# Patient Record
Sex: FEMALE | Race: WHITE | NOT HISPANIC OR LATINO | URBAN - METROPOLITAN AREA
[De-identification: names, ages, dates, MRNs, and addresses within clinical notes are randomized per-mention and may not be internally consistent; named-entity substitution may affect disease eponyms.]

---

## 2017-03-26 ENCOUNTER — INPATIENT (INPATIENT)
Facility: HOSPITAL | Age: 82
LOS: 1 days | Discharge: HOME | End: 2017-03-28
Attending: INTERNAL MEDICINE | Admitting: INTERNAL MEDICINE
Payer: MEDICARE

## 2017-03-26 VITALS
HEART RATE: 79 BPM | RESPIRATION RATE: 20 BRPM | HEIGHT: 64 IN | WEIGHT: 130.07 LBS | OXYGEN SATURATION: 99 % | DIASTOLIC BLOOD PRESSURE: 87 MMHG | TEMPERATURE: 98 F | SYSTOLIC BLOOD PRESSURE: 191 MMHG

## 2017-03-26 DIAGNOSIS — Z90.89 ACQUIRED ABSENCE OF OTHER ORGANS: Chronic | ICD-10-CM

## 2017-03-26 DIAGNOSIS — Z90.721 ACQUIRED ABSENCE OF OVARIES, UNILATERAL: Chronic | ICD-10-CM

## 2017-03-26 LAB
ALBUMIN SERPL ELPH-MCNC: 3.7 G/DL — SIGNIFICANT CHANGE UP (ref 3.3–5)
ALP SERPL-CCNC: 82 U/L — SIGNIFICANT CHANGE UP (ref 40–120)
ALT FLD-CCNC: 16 U/L — SIGNIFICANT CHANGE UP (ref 12–78)
ANION GAP SERPL CALC-SCNC: 8 MMOL/L — SIGNIFICANT CHANGE UP (ref 5–17)
APPEARANCE UR: CLEAR — SIGNIFICANT CHANGE UP
APTT BLD: 32.7 SEC — SIGNIFICANT CHANGE UP (ref 27.5–37.4)
AST SERPL-CCNC: 18 U/L — SIGNIFICANT CHANGE UP (ref 15–37)
BACTERIA # UR AUTO: (no result)
BASOPHILS # BLD AUTO: 0.1 K/UL — SIGNIFICANT CHANGE UP (ref 0–0.2)
BASOPHILS NFR BLD AUTO: 1.3 % — SIGNIFICANT CHANGE UP (ref 0–2)
BILIRUB SERPL-MCNC: 0.4 MG/DL — SIGNIFICANT CHANGE UP (ref 0.2–1.2)
BILIRUB UR-MCNC: NEGATIVE — SIGNIFICANT CHANGE UP
BUN SERPL-MCNC: 7 MG/DL — SIGNIFICANT CHANGE UP (ref 7–23)
CALCIUM SERPL-MCNC: 8.6 MG/DL — SIGNIFICANT CHANGE UP (ref 8.5–10.1)
CHLORIDE SERPL-SCNC: 99 MMOL/L — SIGNIFICANT CHANGE UP (ref 96–108)
CO2 SERPL-SCNC: 28 MMOL/L — SIGNIFICANT CHANGE UP (ref 22–31)
COLOR SPEC: YELLOW — SIGNIFICANT CHANGE UP
CREAT SERPL-MCNC: 0.67 MG/DL — SIGNIFICANT CHANGE UP (ref 0.5–1.3)
DIFF PNL FLD: (no result)
EOSINOPHIL # BLD AUTO: 0 K/UL — SIGNIFICANT CHANGE UP (ref 0–0.5)
EOSINOPHIL NFR BLD AUTO: 0.1 % — SIGNIFICANT CHANGE UP (ref 0–6)
EPI CELLS # UR: SIGNIFICANT CHANGE UP
GLUCOSE SERPL-MCNC: 123 MG/DL — HIGH (ref 70–99)
GLUCOSE UR QL: NEGATIVE MG/DL — SIGNIFICANT CHANGE UP
HCT VFR BLD CALC: 42.3 % — SIGNIFICANT CHANGE UP (ref 34.5–45)
HGB BLD-MCNC: 14.3 G/DL — SIGNIFICANT CHANGE UP (ref 11.5–15.5)
INR BLD: 1.14 RATIO — SIGNIFICANT CHANGE UP (ref 0.88–1.16)
KETONES UR-MCNC: NEGATIVE — SIGNIFICANT CHANGE UP
LEUKOCYTE ESTERASE UR-ACNC: (no result)
LYMPHOCYTES # BLD AUTO: 0.8 K/UL — LOW (ref 1–3.3)
LYMPHOCYTES # BLD AUTO: 15.6 % — SIGNIFICANT CHANGE UP (ref 13–44)
MCHC RBC-ENTMCNC: 31.9 PG — SIGNIFICANT CHANGE UP (ref 27–34)
MCHC RBC-ENTMCNC: 33.9 GM/DL — SIGNIFICANT CHANGE UP (ref 32–36)
MCV RBC AUTO: 94 FL — SIGNIFICANT CHANGE UP (ref 80–100)
MONOCYTES # BLD AUTO: 0.3 K/UL — SIGNIFICANT CHANGE UP (ref 0–0.9)
MONOCYTES NFR BLD AUTO: 5.8 % — SIGNIFICANT CHANGE UP (ref 2–14)
NEUTROPHILS # BLD AUTO: 4.1 K/UL — SIGNIFICANT CHANGE UP (ref 1.8–7.4)
NEUTROPHILS NFR BLD AUTO: 77.2 % — HIGH (ref 43–77)
NITRITE UR-MCNC: NEGATIVE — SIGNIFICANT CHANGE UP
NT-PROBNP SERPL-SCNC: 285 PG/ML — SIGNIFICANT CHANGE UP (ref 0–450)
PH UR: 8 — SIGNIFICANT CHANGE UP (ref 4.8–8)
PLATELET # BLD AUTO: 227 K/UL — SIGNIFICANT CHANGE UP (ref 150–400)
POTASSIUM SERPL-MCNC: 3.7 MMOL/L — SIGNIFICANT CHANGE UP (ref 3.5–5.3)
POTASSIUM SERPL-SCNC: 3.7 MMOL/L — SIGNIFICANT CHANGE UP (ref 3.5–5.3)
PROT SERPL-MCNC: 7.4 GM/DL — SIGNIFICANT CHANGE UP (ref 6–8.3)
PROT UR-MCNC: 15 MG/DL
PROTHROM AB SERPL-ACNC: 12.3 SEC — SIGNIFICANT CHANGE UP (ref 9.8–12.7)
RBC # BLD: 4.5 M/UL — SIGNIFICANT CHANGE UP (ref 3.8–5.2)
RBC # FLD: 13 % — SIGNIFICANT CHANGE UP (ref 10.3–14.5)
RBC CASTS # UR COMP ASSIST: SIGNIFICANT CHANGE UP /HPF (ref 0–4)
SODIUM SERPL-SCNC: 135 MMOL/L — SIGNIFICANT CHANGE UP (ref 135–145)
SP GR SPEC: 1.01 — SIGNIFICANT CHANGE UP (ref 1.01–1.02)
TROPONIN I SERPL-MCNC: <0.015 NG/ML — SIGNIFICANT CHANGE UP (ref 0.01–0.04)
TROPONIN I SERPL-MCNC: <0.015 NG/ML — SIGNIFICANT CHANGE UP (ref 0.01–0.04)
TSH SERPL-MCNC: 2.27 UIU/ML — SIGNIFICANT CHANGE UP (ref 0.36–3.74)
UROBILINOGEN FLD QL: NEGATIVE MG/DL — SIGNIFICANT CHANGE UP
WBC # BLD: 5.2 K/UL — SIGNIFICANT CHANGE UP (ref 3.8–10.5)
WBC # FLD AUTO: 5.2 K/UL — SIGNIFICANT CHANGE UP (ref 3.8–10.5)
WBC UR QL: SIGNIFICANT CHANGE UP

## 2017-03-26 PROCEDURE — 71010: CPT | Mod: 26

## 2017-03-26 PROCEDURE — 99285 EMERGENCY DEPT VISIT HI MDM: CPT

## 2017-03-26 PROCEDURE — 70450 CT HEAD/BRAIN W/O DYE: CPT | Mod: 26

## 2017-03-26 PROCEDURE — 71250 CT THORAX DX C-: CPT | Mod: 26

## 2017-03-26 PROCEDURE — 93010 ELECTROCARDIOGRAM REPORT: CPT

## 2017-03-26 RX ORDER — SODIUM CHLORIDE 9 MG/ML
1000 INJECTION INTRAMUSCULAR; INTRAVENOUS; SUBCUTANEOUS
Qty: 0 | Refills: 0 | Status: DISCONTINUED | OUTPATIENT
Start: 2017-03-26 | End: 2017-03-27

## 2017-03-26 RX ORDER — ONDANSETRON 8 MG/1
4 TABLET, FILM COATED ORAL EVERY 6 HOURS
Qty: 0 | Refills: 0 | Status: DISCONTINUED | OUTPATIENT
Start: 2017-03-26 | End: 2017-03-28

## 2017-03-26 RX ORDER — SODIUM CHLORIDE 9 MG/ML
3 INJECTION INTRAMUSCULAR; INTRAVENOUS; SUBCUTANEOUS ONCE
Qty: 0 | Refills: 0 | Status: COMPLETED | OUTPATIENT
Start: 2017-03-26 | End: 2017-03-26

## 2017-03-26 RX ORDER — HEPARIN SODIUM 5000 [USP'U]/ML
5000 INJECTION INTRAVENOUS; SUBCUTANEOUS EVERY 12 HOURS
Qty: 0 | Refills: 0 | Status: DISCONTINUED | OUTPATIENT
Start: 2017-03-26 | End: 2017-03-28

## 2017-03-26 RX ORDER — SODIUM CHLORIDE 9 MG/ML
2000 INJECTION INTRAMUSCULAR; INTRAVENOUS; SUBCUTANEOUS ONCE
Qty: 0 | Refills: 0 | Status: COMPLETED | OUTPATIENT
Start: 2017-03-26 | End: 2017-03-26

## 2017-03-26 RX ORDER — ASPIRIN/CALCIUM CARB/MAGNESIUM 324 MG
325 TABLET ORAL ONCE
Qty: 0 | Refills: 0 | Status: COMPLETED | OUTPATIENT
Start: 2017-03-26 | End: 2017-03-26

## 2017-03-26 RX ADMIN — SODIUM CHLORIDE 70 MILLILITER(S): 9 INJECTION INTRAMUSCULAR; INTRAVENOUS; SUBCUTANEOUS at 19:48

## 2017-03-26 RX ADMIN — SODIUM CHLORIDE 2000 MILLILITER(S): 9 INJECTION INTRAMUSCULAR; INTRAVENOUS; SUBCUTANEOUS at 10:06

## 2017-03-26 RX ADMIN — SODIUM CHLORIDE 3 MILLILITER(S): 9 INJECTION INTRAMUSCULAR; INTRAVENOUS; SUBCUTANEOUS at 10:06

## 2017-03-26 NOTE — ED PROVIDER NOTE - MEDICAL DECISION MAKING DETAILS
Elderly female pw dizziness and weakness likely near syncope will perform labs CT, XR and admit to hospital service for further evaluation

## 2017-03-26 NOTE — ED PROVIDER NOTE - OBJECTIVE STATEMENT
89 y/o F with Hx of HLD, tonsillectomy,  ovary removal presents to ED for sudden onset of generalized weakness and dizziness. Pt states today she woke up and felt dizzy, she then felt generalized weakness which has made it difficult to walk without support. Pt denies fever, chills, HA, SOB. No travel or sick contact. No illegal drugs, former smoker.

## 2017-03-26 NOTE — H&P ADULT - NSHPLABSRESULTS_GEN_ALL_CORE
CARDIAC MARKERS ( 26 Mar 2017 13:45 )  <0.015 ng/mL / x     / x     / x     / x      CARDIAC MARKERS ( 26 Mar 2017 11:14 )  <0.015 ng/mL / x     / x     / x     / x                                14.3   5.2   )-----------( 227      ( 26 Mar 2017 11:14 )             42.3     26 Mar 2017 11:14    135    |  99     |  7      ----------------------------<  123    3.7     |  28     |  0.67     Ca    8.6        26 Mar 2017 11:14    TPro  7.4    /  Alb  3.7    /  TBili  0.4    /  DBili  x      /  AST  18     /  ALT  16     /  AlkPhos  82     26 Mar 2017 11:14    PT/INR - ( 26 Mar 2017 11:14 )   PT: 12.3 sec;   INR: 1.14 ratio         PTT - ( 26 Mar 2017 11:14 )  PTT:32.7 sec  CAPILLARY BLOOD GLUCOSE    LIVER FUNCTIONS - ( 26 Mar 2017 11:14 )  Alb: 3.7 g/dL / Pro: 7.4 gm/dL / ALK PHOS: 82 U/L / ALT: 16 U/L / AST: 18 U/L / GGT: x           Urinalysis Basic - ( 26 Mar 2017 13:15 )    Color: Yellow / Appearance: Clear / S.010 / pH: x  Gluc: x / Ketone: Negative  / Bili: Negative / Urobili: Negative mg/dL   Blood: x / Protein: 15 mg/dL / Nitrite: Negative   Leuk Esterase: Trace / RBC: 0-2 /HPF / WBC 3-5   Sq Epi: x / Non Sq Epi: Occasional / Bacteria: Moderate

## 2017-03-26 NOTE — H&P ADULT - NSHPPHYSICALEXAM_GEN_ALL_CORE
T(C): 36.5, Max: 36.5 (03-26 @ 10:03)  HR: 79 (78 - 81)  BP: 114/99 (114/99 - 191/87)  RR: 20 (20 - 22)  SpO2: 95% (95% - 99%)  Wt(kg): --    Gen: AAOx3, NAD  HEENT: NCAT, EOMI  Neck: Supple  CV: nml S1S2, RRR  Lungs: CTABL  Abd: Soft, NT, ND, BS+  Ext: No edema  Neuro: Non focal

## 2017-03-26 NOTE — H&P ADULT - HISTORY OF PRESENT ILLNESS
Patient 89yo female with PMHx of HLD, presents to the ER for weakness and dizziness. Pt reports that she has had chronic dizziness, for the last 2-3 months, characterized by dizziness/lightheadedness when standing up from sitting position. Pt denies falls, trauma, syncope, LOC. Pt denies fever, chills, chest pain, sob. No other constitutional symptoms. She states that when gets up from sitting position she feels dizzy, which makes it difficult for her to ambulate.

## 2017-03-26 NOTE — ED PROVIDER NOTE - NS ED MD SCRIBE ATTENDING SCRIBE SECTIONS
DISPOSITION/VITAL SIGNS( Pullset)/PHYSICAL EXAM/HISTORY OF PRESENT ILLNESS/PAST MEDICAL/SURGICAL/SOCIAL HISTORY/REVIEW OF SYSTEMS RESULTS/HISTORY OF PRESENT ILLNESS/PHYSICAL EXAM/PAST MEDICAL/SURGICAL/SOCIAL HISTORY/VITAL SIGNS( Pullset)/REVIEW OF SYSTEMS/DISPOSITION

## 2017-03-26 NOTE — H&P ADULT - ASSESSMENT
89yo female with near syncope, dizziness    # Dizziness/Near Syncope  - unclear etiology  - pt afebrile, HD stable, comfortable on room air, non focal neuro exam  - EKG with 1st deg AVB, NO ischemic changes  - CTH negative for acute intracranial pathology  - ECHO    # HLD  - check Lipid panel    # DVT ppx HSQ

## 2017-03-26 NOTE — H&P ADULT - NSHPREVIEWOFSYSTEMS_GEN_ALL_CORE
(-)Fever, chills, cough, chest pain, sob, headache, palpitations, abd pain, n/v/d, leg swelling  (+) dizziness

## 2017-03-27 LAB
ANION GAP SERPL CALC-SCNC: 8 MMOL/L — SIGNIFICANT CHANGE UP (ref 5–17)
BASOPHILS # BLD AUTO: 0.1 K/UL — SIGNIFICANT CHANGE UP (ref 0–0.2)
BASOPHILS NFR BLD AUTO: 1.5 % — SIGNIFICANT CHANGE UP (ref 0–2)
BUN SERPL-MCNC: 6 MG/DL — LOW (ref 7–23)
CALCIUM SERPL-MCNC: 8.1 MG/DL — LOW (ref 8.5–10.1)
CHLORIDE SERPL-SCNC: 105 MMOL/L — SIGNIFICANT CHANGE UP (ref 96–108)
CO2 SERPL-SCNC: 26 MMOL/L — SIGNIFICANT CHANGE UP (ref 22–31)
CREAT SERPL-MCNC: 0.54 MG/DL — SIGNIFICANT CHANGE UP (ref 0.5–1.3)
CULTURE RESULTS: SIGNIFICANT CHANGE UP
EOSINOPHIL # BLD AUTO: 0 K/UL — SIGNIFICANT CHANGE UP (ref 0–0.5)
EOSINOPHIL NFR BLD AUTO: 0.8 % — SIGNIFICANT CHANGE UP (ref 0–6)
GLUCOSE SERPL-MCNC: 89 MG/DL — SIGNIFICANT CHANGE UP (ref 70–99)
HCT VFR BLD CALC: 37.5 % — SIGNIFICANT CHANGE UP (ref 34.5–45)
HGB BLD-MCNC: 12.4 G/DL — SIGNIFICANT CHANGE UP (ref 11.5–15.5)
LYMPHOCYTES # BLD AUTO: 1.5 K/UL — SIGNIFICANT CHANGE UP (ref 1–3.3)
LYMPHOCYTES # BLD AUTO: 25.6 % — SIGNIFICANT CHANGE UP (ref 13–44)
MCHC RBC-ENTMCNC: 31.2 PG — SIGNIFICANT CHANGE UP (ref 27–34)
MCHC RBC-ENTMCNC: 33 GM/DL — SIGNIFICANT CHANGE UP (ref 32–36)
MCV RBC AUTO: 94.5 FL — SIGNIFICANT CHANGE UP (ref 80–100)
MONOCYTES # BLD AUTO: 0.6 K/UL — SIGNIFICANT CHANGE UP (ref 0–0.9)
MONOCYTES NFR BLD AUTO: 10.3 % — SIGNIFICANT CHANGE UP (ref 2–14)
NEUTROPHILS # BLD AUTO: 3.6 K/UL — SIGNIFICANT CHANGE UP (ref 1.8–7.4)
NEUTROPHILS NFR BLD AUTO: 61.8 % — SIGNIFICANT CHANGE UP (ref 43–77)
PLATELET # BLD AUTO: 222 K/UL — SIGNIFICANT CHANGE UP (ref 150–400)
POTASSIUM SERPL-MCNC: 3.5 MMOL/L — SIGNIFICANT CHANGE UP (ref 3.5–5.3)
POTASSIUM SERPL-SCNC: 3.5 MMOL/L — SIGNIFICANT CHANGE UP (ref 3.5–5.3)
RBC # BLD: 3.97 M/UL — SIGNIFICANT CHANGE UP (ref 3.8–5.2)
RBC # FLD: 13.4 % — SIGNIFICANT CHANGE UP (ref 10.3–14.5)
SODIUM SERPL-SCNC: 139 MMOL/L — SIGNIFICANT CHANGE UP (ref 135–145)
SPECIMEN SOURCE: SIGNIFICANT CHANGE UP
WBC # BLD: 5.8 K/UL — SIGNIFICANT CHANGE UP (ref 3.8–10.5)
WBC # FLD AUTO: 5.8 K/UL — SIGNIFICANT CHANGE UP (ref 3.8–10.5)

## 2017-03-27 PROCEDURE — 93306 TTE W/DOPPLER COMPLETE: CPT | Mod: 26

## 2017-03-27 RX ORDER — POTASSIUM CHLORIDE 20 MEQ
40 PACKET (EA) ORAL ONCE
Qty: 0 | Refills: 0 | Status: COMPLETED | OUTPATIENT
Start: 2017-03-27 | End: 2017-03-27

## 2017-03-27 RX ADMIN — Medication 40 MILLIEQUIVALENT(S): at 12:31

## 2017-03-27 RX ADMIN — Medication 1 TABLET(S): at 12:31

## 2017-03-27 NOTE — PROGRESS NOTE ADULT - SUBJECTIVE AND OBJECTIVE BOX
Chief Complaint/Reason for Admission: Dizziness	  History of Present Illness: 	  Patient 89yo female with PMHx of HLD, presents to the ER for weakness and dizziness. Pt reports that she has had chronic dizziness, for the last 2-3 months, characterized by dizziness/lightheadedness when standing up from sitting position. Pt denies falls, trauma, syncope, LOC. Pt denies fever, chills, chest pain, sob. No other constitutional symptoms. She states that when gets up from sitting position she feels dizzy, which makes it difficult for her to ambulate.     3/27: Pt states that symptoms occured for 1 day and feels better today.  Walked with PT today. No CP, sob, n/v, diaphoreisis, palpitations.     Review of Systems:	  Other Review of Systems: All other review of systems negative, except as noted in HPI	    Vital Signs Last 24 Hrs  T(C): 36.9, Max: 36.9 (03-26 @ 18:42)  T(F): 98.5, Max: 98.5 (03-27 @ 10:50)  HR: 73 (73 - 86)  BP: 149/69 (146/55 - 176/74)  BP(mean): --  RR: 17 (17 - 17)  SpO2: 97% (97% - 98%)    I&O's Summary  I & Os for 24h ending 27 Mar 2017 07:00  =============================================  IN: 605 ml / OUT: 400 ml / NET: 205 ml    I & Os for current day (as of 27 Mar 2017 16:28)  =============================================  IN: 430 ml / OUT: 2 ml / NET: 428 ml      CAPILLARY BLOOD GLUCOSE      PHYSICAL EXAM:  Constitutional: NAD, awake and alert  HEENT: EOMI, Normal Hearing, MMM  Neck: Soft and supple, No JVD  Respiratory: Breath sounds are clear bilaterally, No wheezing, rales or rhonchi  Cardiovascular: S1 and S2, regular rate and rhythm, no Murmurs, gallops or rubs  Gastrointestinal: Bowel Sounds present, soft, nontender, nondistended, no guarding, no rebound  Extremities: No peripheral edema  Vascular: 2+ peripheral pulses  Neurological: A/O x 3, no focal deficits  Musculoskeletal: 5/5 strength b/l upper and lower extremities  Skin: No rashes    MEDICATIONS:  MEDICATIONS  (STANDING):  heparin  Injectable 5000Unit(s) SubCutaneous every 12 hours  sodium chloride 0.9%. 1000milliLiter(s) IV Continuous <Continuous>  multivitamin 1Tablet(s) Oral daily      LABS: All Labs Reviewed:                        12.4   5.8   )-----------( 222      ( 27 Mar 2017 05:50 )             37.5     27 Mar 2017 05:50    139    |  105    |  6      ----------------------------<  89     3.5     |  26     |  0.54     Ca    8.1        27 Mar 2017 05:50    TPro  7.4    /  Alb  3.7    /  TBili  0.4    /  DBili  x      /  AST  18     /  ALT  16     /  AlkPhos  82     26 Mar 2017 11:14    PT/INR - ( 26 Mar 2017 11:14 )   PT: 12.3 sec;   INR: 1.14 ratio         PTT - ( 26 Mar 2017 11:14 )  PTT:32.7 sec  CARDIAC MARKERS ( 26 Mar 2017 13:45 )  <0.015 ng/mL / x     / x     / x     / x      CARDIAC MARKERS ( 26 Mar 2017 11:14 )  <0.015 ng/mL / x     / x     / x     / x

## 2017-03-27 NOTE — PROGRESS NOTE ADULT - ASSESSMENT
89yo female with near syncope, dizziness    # Dizziness/Near Syncope  - unclear etiology  - pt afebrile, HD stable, comfortable on room air, non focal neuro exam  - EKG with 1st deg AVB, NO ischemic changes  - CTH negative for acute intracranial pathology  - ECHO- unremarkable    # HLD  - check Lipid panel    # DVT ppx HSQ

## 2017-03-27 NOTE — PHYSICAL THERAPY INITIAL EVALUATION ADULT - MODALITIES TREATMENT COMMENTS
pt left seated in chair post Eval; chair alarm donned; HM in place; callbell in reach; pt instructed not to get up alone; call nursing for assist; juan well; pt denied pain post Eval; RASHAD Miranda made aware pt OOB

## 2017-03-28 ENCOUNTER — TRANSCRIPTION ENCOUNTER (OUTPATIENT)
Age: 82
End: 2017-03-28

## 2017-03-28 VITALS
OXYGEN SATURATION: 96 % | SYSTOLIC BLOOD PRESSURE: 135 MMHG | DIASTOLIC BLOOD PRESSURE: 54 MMHG | TEMPERATURE: 98 F | HEART RATE: 64 BPM | RESPIRATION RATE: 18 BRPM

## 2017-03-28 NOTE — PROGRESS NOTE ADULT - SUBJECTIVE AND OBJECTIVE BOX
Chief Complaint/Reason for Admission: Dizziness	  History of Present Illness: 	  Patient 89yo female with PMHx of HLD, presents to the ER for weakness and dizziness. Pt reports that she has had chronic dizziness, for the last 2-3 months, characterized by dizziness/lightheadedness when standing up from sitting position. Pt denies falls, trauma, syncope, LOC. Pt denies fever, chills, chest pain, sob. No other constitutional symptoms. She states that when gets up from sitting position she feels dizzy, which makes it difficult for her to ambulate.     3/27: Pt states that symptoms occured for 1 day and feels better today.  Walked with PT today. No CP, sob, n/v, diaphoreisis, palpitations.   3/28: Feeling weak and does not feel capable of going home. Feels she would have difficulty walking without falling and taking care of herself at home.    Review of Systems:	  Other Review of Systems: All other review of systems negative, except as noted in HPI	      Vital Signs Last 24 Hrs  T(C): 37, Max: 37 (03-28 @ 05:50)  T(F): 98.6, Max: 98.6 (03-28 @ 05:50)  HR: 69 (69 - 83)  BP: 159/59 (141/55 - 159/59)  BP(mean): --  RR: 18 (17 - 18)  SpO2: 97% (97% - 98%)    I&O's Summary    I & Os for current day (as of 28 Mar 2017 09:21)  =============================================  IN: 550 ml / OUT: 2 ml / NET: 548 ml      CAPILLARY BLOOD GLUCOSE      PHYSICAL EXAM:  Constitutional: NAD, awake and alert, frail  HEENT: EOMI, Normal Hearing, MMM  Neck: Soft and supple, No JVD  Respiratory: Breath sounds are clear bilaterally, No wheezing, rales or rhonchi  Cardiovascular: S1 and S2, regular rate and rhythm, no Murmurs, gallops or rubs  Gastrointestinal: Bowel Sounds present, soft, nontender, nondistended, no guarding, no rebound  Extremities: No peripheral edema  Vascular: 2+ peripheral pulses  Neurological: A/O x 3, no focal deficits  Musculoskeletal: 5/5 strength b/l upper and lower extremities  Skin: No rashes    MEDICATIONS:  MEDICATIONS  (STANDING):  heparin  Injectable 5000Unit(s) SubCutaneous every 12 hours  multivitamin 1Tablet(s) Oral daily      LABS: All Labs Reviewed:                        12.4   5.8   )-----------( 222      ( 27 Mar 2017 05:50 )             37.5     27 Mar 2017 05:50    139    |  105    |  6      ----------------------------<  89     3.5     |  26     |  0.54     Ca    8.1        27 Mar 2017 05:50    TPro  7.4    /  Alb  3.7    /  TBili  0.4    /  DBili  x      /  AST  18     /  ALT  16     /  AlkPhos  82     26 Mar 2017 11:14    PT/INR - ( 26 Mar 2017 11:14 )   PT: 12.3 sec;   INR: 1.14 ratio         PTT - ( 26 Mar 2017 11:14 )  PTT:32.7 sec  CARDIAC MARKERS ( 26 Mar 2017 13:45 )  <0.015 ng/mL / x     / x     / x     / x      CARDIAC MARKERS ( 26 Mar 2017 11:14 )  <0.015 ng/mL / x     / x     / x     / x          Blood Culture: 03-26 @ 11:09  Organism --  Gram Stain Blood -- Gram Stain --  Specimen Source .Urine Catheterized  Culture-Blood -- Chief Complaint/Reason for Admission: Dizziness	  History of Present Illness: 	  Patient 89yo female with PMHx of HLD, presents to the ER for weakness and dizziness. Pt reports that she has had chronic dizziness, for the last 2-3 months, characterized by dizziness/lightheadedness when standing up from sitting position. Pt denies falls, trauma, syncope, LOC. Pt denies fever, chills, chest pain, sob. No other constitutional symptoms. She states that when gets up from sitting position she feels dizzy, which makes it difficult for her to ambulate.     3/27: Pt states that symptoms occured for 1 day and feels better today.  Walked with PT today. No CP, sob, n/v, diaphoreisis, palpitations.   3/28: Ambulating in hallway, wants to go home. has no complaints presently. No lightheadedness, no cp, palpitations, sob, n/v, diaphoresis.    Review of Systems:	  Other Review of Systems: All other review of systems negative, except as noted in HPI	      Vital Signs Last 24 Hrs  T(C): 37, Max: 37 (03-28 @ 05:50)  T(F): 98.6, Max: 98.6 (03-28 @ 05:50)  HR: 69 (69 - 83)  BP: 159/59 (141/55 - 159/59)  BP(mean): --  RR: 18 (17 - 18)  SpO2: 97% (97% - 98%)    I&O's Summary    I & Os for current day (as of 28 Mar 2017 09:21)  =============================================  IN: 550 ml / OUT: 2 ml / NET: 548 ml      CAPILLARY BLOOD GLUCOSE      PHYSICAL EXAM:  Constitutional: NAD, awake and alert, frail  HEENT: EOMI, Normal Hearing, MMM  Neck: Soft and supple, No JVD  Respiratory: Breath sounds are clear bilaterally, No wheezing, rales or rhonchi  Cardiovascular: S1 and S2, regular rate and rhythm, no Murmurs, gallops or rubs  Gastrointestinal: Bowel Sounds present, soft, nontender, nondistended, no guarding, no rebound  Extremities: No peripheral edema  Vascular: 2+ peripheral pulses  Neurological: A/O x 3, no focal deficits  Musculoskeletal: 5/5 strength b/l upper and lower extremities  Skin: No rashes    MEDICATIONS:  MEDICATIONS  (STANDING):  heparin  Injectable 5000Unit(s) SubCutaneous every 12 hours  multivitamin 1Tablet(s) Oral daily      LABS: All Labs Reviewed:                        12.4   5.8   )-----------( 222      ( 27 Mar 2017 05:50 )             37.5     27 Mar 2017 05:50    139    |  105    |  6      ----------------------------<  89     3.5     |  26     |  0.54     Ca    8.1        27 Mar 2017 05:50    TPro  7.4    /  Alb  3.7    /  TBili  0.4    /  DBili  x      /  AST  18     /  ALT  16     /  AlkPhos  82     26 Mar 2017 11:14    PT/INR - ( 26 Mar 2017 11:14 )   PT: 12.3 sec;   INR: 1.14 ratio         PTT - ( 26 Mar 2017 11:14 )  PTT:32.7 sec  CARDIAC MARKERS ( 26 Mar 2017 13:45 )  <0.015 ng/mL / x     / x     / x     / x      CARDIAC MARKERS ( 26 Mar 2017 11:14 )  <0.015 ng/mL / x     / x     / x     / x          Blood Culture: 03-26 @ 11:09  Organism --  Gram Stain Blood -- Gram Stain --  Specimen Source .Urine Catheterized  Culture-Blood --

## 2017-03-28 NOTE — DISCHARGE NOTE ADULT - HOSPITAL COURSE
Patient 89yo female with PMHx of HLD, presents to the ER for weakness and dizziness. Pt reports that she has had chronic dizziness, for the last 2-3 months, characterized by dizziness/lightheadedness when standing up from sitting position. Pt denies falls, trauma, syncope, LOC. Pt denies fever, chills, chest pain, sob. No other constitutional symptoms. She states that when gets up from sitting position she feels dizzy, which makes it difficult for her to ambulate.     3/27: Pt states that symptoms occured for 1 day and feels better today.  Walked with PT today. No CP, sob, n/v, diaphoreisis, palpitations.   3/28: Ambulating in hallway, wants to go home. has no complaints presently. No lightheadedness, no cp, palpitations, sob, n/v, diaphoresis.  Ambulating well with PT.  Pt agreeable to returning home.

## 2017-03-28 NOTE — DISCHARGE NOTE ADULT - CARE PLAN
Principal Discharge DX:	Dizziness  Goal:	return to baseline  Instructions for follow-up, activity and diet:	return home. continue activities per routine.

## 2017-03-28 NOTE — PROGRESS NOTE ADULT - ASSESSMENT
89yo female with near syncope, dizziness    # Dizziness/Near Syncope  - unclear etiology  - pt afebrile, HD stable, comfortable on room air, non focal neuro exam  - EKG with 1st deg AVB, NO ischemic changes  - CTH negative for acute intracranial pathology  - ECHO- unremarkable  - no events on tele- transfer to med-surg    # HLD  - check Lipid panel    # Malnutrition  - Nutritional assessment    # DVT ppx HSQ    Dispo: PT.  AYLIN upon discharge. 89yo female with near syncope, dizziness    # Dizziness/Near Syncope  - unclear etiology  - pt afebrile, HD stable, comfortable on room air, non focal neuro exam  - EKG with 1st deg AVB, NO ischemic changes  - CTH negative for acute intracranial pathology  - ECHO- unremarkable  - no events on tele- transfer to med-surg    # HLD  - check Lipid panel    # DVT ppx HSQ    Dispo: dc home

## 2017-03-28 NOTE — DISCHARGE NOTE ADULT - PATIENT PORTAL LINK FT
“You can access the [employer] Patient Portal, offered by [facility], by registering with the following website: [email or web address]”

## 2017-03-30 DIAGNOSIS — E78.5 HYPERLIPIDEMIA, UNSPECIFIED: ICD-10-CM

## 2017-03-30 DIAGNOSIS — Z88.5 ALLERGY STATUS TO NARCOTIC AGENT: ICD-10-CM

## 2017-03-30 DIAGNOSIS — R42 DIZZINESS AND GIDDINESS: ICD-10-CM

## 2017-03-30 DIAGNOSIS — Z87.891 PERSONAL HISTORY OF NICOTINE DEPENDENCE: ICD-10-CM

## 2017-03-30 DIAGNOSIS — Z88.8 ALLERGY STATUS TO OTHER DRUGS, MEDICAMENTS AND BIOLOGICAL SUBSTANCES STATUS: ICD-10-CM

## 2017-03-30 DIAGNOSIS — Z90.722 ACQUIRED ABSENCE OF OVARIES, BILATERAL: ICD-10-CM

## 2017-03-30 DIAGNOSIS — R55 SYNCOPE AND COLLAPSE: ICD-10-CM

## 2017-09-12 ENCOUNTER — EMERGENCY (EMERGENCY)
Facility: HOSPITAL | Age: 82
LOS: 1 days | Discharge: HOME | End: 2017-09-12
Attending: STUDENT IN AN ORGANIZED HEALTH CARE EDUCATION/TRAINING PROGRAM
Payer: MEDICARE

## 2017-09-12 VITALS
SYSTOLIC BLOOD PRESSURE: 144 MMHG | HEART RATE: 79 BPM | DIASTOLIC BLOOD PRESSURE: 88 MMHG | RESPIRATION RATE: 16 BRPM | OXYGEN SATURATION: 96 %

## 2017-09-12 VITALS
DIASTOLIC BLOOD PRESSURE: 84 MMHG | HEART RATE: 83 BPM | WEIGHT: 139.99 LBS | RESPIRATION RATE: 18 BRPM | SYSTOLIC BLOOD PRESSURE: 158 MMHG | OXYGEN SATURATION: 98 % | TEMPERATURE: 98 F

## 2017-09-12 DIAGNOSIS — Z90.721 ACQUIRED ABSENCE OF OVARIES, UNILATERAL: Chronic | ICD-10-CM

## 2017-09-12 DIAGNOSIS — Z90.89 ACQUIRED ABSENCE OF OTHER ORGANS: Chronic | ICD-10-CM

## 2017-09-12 LAB
ALBUMIN SERPL ELPH-MCNC: 4.3 G/DL — SIGNIFICANT CHANGE UP (ref 3.3–5.2)
ALP SERPL-CCNC: 85 U/L — SIGNIFICANT CHANGE UP (ref 40–120)
ALT FLD-CCNC: 10 U/L — SIGNIFICANT CHANGE UP
ANION GAP SERPL CALC-SCNC: 12 MMOL/L — SIGNIFICANT CHANGE UP (ref 5–17)
AST SERPL-CCNC: 18 U/L — SIGNIFICANT CHANGE UP
BASOPHILS # BLD AUTO: 0 K/UL — SIGNIFICANT CHANGE UP (ref 0–0.2)
BASOPHILS NFR BLD AUTO: 0.4 % — SIGNIFICANT CHANGE UP (ref 0–2)
BILIRUB SERPL-MCNC: 0.5 MG/DL — SIGNIFICANT CHANGE UP (ref 0.4–2)
BUN SERPL-MCNC: 11 MG/DL — SIGNIFICANT CHANGE UP (ref 8–20)
CALCIUM SERPL-MCNC: 8.8 MG/DL — SIGNIFICANT CHANGE UP (ref 8.6–10.2)
CHLORIDE SERPL-SCNC: 93 MMOL/L — LOW (ref 98–107)
CK SERPL-CCNC: 45 U/L — SIGNIFICANT CHANGE UP (ref 25–170)
CO2 SERPL-SCNC: 27 MMOL/L — SIGNIFICANT CHANGE UP (ref 22–29)
CREAT SERPL-MCNC: 0.59 MG/DL — SIGNIFICANT CHANGE UP (ref 0.5–1.3)
EOSINOPHIL # BLD AUTO: 0 K/UL — SIGNIFICANT CHANGE UP (ref 0–0.5)
EOSINOPHIL NFR BLD AUTO: 0.4 % — SIGNIFICANT CHANGE UP (ref 0–6)
GLUCOSE SERPL-MCNC: 108 MG/DL — SIGNIFICANT CHANGE UP (ref 70–115)
HCT VFR BLD CALC: 40.8 % — SIGNIFICANT CHANGE UP (ref 37–47)
HGB BLD-MCNC: 13.8 G/DL — SIGNIFICANT CHANGE UP (ref 12–16)
INR BLD: 1.14 RATIO — SIGNIFICANT CHANGE UP (ref 0.88–1.16)
LYMPHOCYTES # BLD AUTO: 1.7 K/UL — SIGNIFICANT CHANGE UP (ref 1–4.8)
LYMPHOCYTES # BLD AUTO: 25.4 % — SIGNIFICANT CHANGE UP (ref 20–55)
MCHC RBC-ENTMCNC: 32.3 PG — HIGH (ref 27–31)
MCHC RBC-ENTMCNC: 33.8 G/DL — SIGNIFICANT CHANGE UP (ref 32–36)
MCV RBC AUTO: 95.6 FL — SIGNIFICANT CHANGE UP (ref 81–99)
MONOCYTES # BLD AUTO: 0.6 K/UL — SIGNIFICANT CHANGE UP (ref 0–0.8)
MONOCYTES NFR BLD AUTO: 9.6 % — SIGNIFICANT CHANGE UP (ref 3–10)
NEUTROPHILS # BLD AUTO: 4.3 K/UL — SIGNIFICANT CHANGE UP (ref 1.8–8)
NEUTROPHILS NFR BLD AUTO: 64.1 % — SIGNIFICANT CHANGE UP (ref 37–73)
PLATELET # BLD AUTO: 237 K/UL — SIGNIFICANT CHANGE UP (ref 150–400)
POTASSIUM SERPL-MCNC: 4.6 MMOL/L — SIGNIFICANT CHANGE UP (ref 3.5–5.3)
POTASSIUM SERPL-SCNC: 4.6 MMOL/L — SIGNIFICANT CHANGE UP (ref 3.5–5.3)
PROT SERPL-MCNC: 7.2 G/DL — SIGNIFICANT CHANGE UP (ref 6.6–8.7)
PROTHROM AB SERPL-ACNC: 12.6 SEC — SIGNIFICANT CHANGE UP (ref 9.8–12.7)
RBC # BLD: 4.27 M/UL — LOW (ref 4.4–5.2)
RBC # FLD: 12.9 % — SIGNIFICANT CHANGE UP (ref 11–15.6)
SODIUM SERPL-SCNC: 132 MMOL/L — LOW (ref 135–145)
TROPONIN T SERPL-MCNC: <0.01 NG/ML — SIGNIFICANT CHANGE UP (ref 0–0.06)
WBC # BLD: 6.8 K/UL — SIGNIFICANT CHANGE UP (ref 4.8–10.8)
WBC # FLD AUTO: 6.8 K/UL — SIGNIFICANT CHANGE UP (ref 4.8–10.8)

## 2017-09-12 PROCEDURE — 71046 X-RAY EXAM CHEST 2 VIEWS: CPT

## 2017-09-12 PROCEDURE — 85027 COMPLETE CBC AUTOMATED: CPT

## 2017-09-12 PROCEDURE — 71100 X-RAY EXAM RIBS UNI 2 VIEWS: CPT | Mod: 26,RT

## 2017-09-12 PROCEDURE — 82550 ASSAY OF CK (CPK): CPT

## 2017-09-12 PROCEDURE — 70450 CT HEAD/BRAIN W/O DYE: CPT

## 2017-09-12 PROCEDURE — 71020: CPT | Mod: 26

## 2017-09-12 PROCEDURE — 99284 EMERGENCY DEPT VISIT MOD MDM: CPT | Mod: 25

## 2017-09-12 PROCEDURE — 36415 COLL VENOUS BLD VENIPUNCTURE: CPT

## 2017-09-12 PROCEDURE — 80053 COMPREHEN METABOLIC PANEL: CPT

## 2017-09-12 PROCEDURE — 85610 PROTHROMBIN TIME: CPT

## 2017-09-12 PROCEDURE — 99285 EMERGENCY DEPT VISIT HI MDM: CPT

## 2017-09-12 PROCEDURE — 71101 X-RAY EXAM UNILAT RIBS/CHEST: CPT

## 2017-09-12 PROCEDURE — 84484 ASSAY OF TROPONIN QUANT: CPT

## 2017-09-12 PROCEDURE — 70450 CT HEAD/BRAIN W/O DYE: CPT | Mod: 26

## 2017-09-12 RX ORDER — SODIUM CHLORIDE 9 MG/ML
3 INJECTION INTRAMUSCULAR; INTRAVENOUS; SUBCUTANEOUS ONCE
Qty: 0 | Refills: 0 | Status: COMPLETED | OUTPATIENT
Start: 2017-09-12 | End: 2017-09-12

## 2017-09-12 RX ADMIN — SODIUM CHLORIDE 3 MILLILITER(S): 9 INJECTION INTRAMUSCULAR; INTRAVENOUS; SUBCUTANEOUS at 21:50

## 2017-09-12 NOTE — ED PROVIDER NOTE - OBJECTIVE STATEMENT
89 y/o F PSHx appendectomy and oophorectomy and PMHx hiatal hernia and acid reflux presents to ED c/o rib pain s/p fall 3 days ago. Pt reports she fell down her cement steps. [place] [facility] [place] done and was sent over to ED due to broken ribs.On 75mg Zantac BID. Non-smoker. Denies LOC, head trauma, N/V/D, fever, chills, SOB, CP, difficulty breathing, HA, numbness, tingling and abd pain. 89 y/o F PSHx appendectomy and oophorectomy and PMHx hiatal hernia and acid reflux presents to ED c/o rib pain s/p fall 3 days ago. Pt reports she fell down her cement steps. Went to [facility] due to pain, had x-rays done and was sent over to ED due to broken ribs. On 75mg Zantac BID. Non-smoker. Denies LOC, head trauma, N/V/D, fever, chills, SOB, CP, difficulty breathing, HA, numbness, tingling and abd pain.

## 2017-09-12 NOTE — ED PROVIDER NOTE - MEDICAL DECISION MAKING DETAILS
Will evaluate for extent of chest wall injury and treat accordingly. Patient with acute chest wall pain likely secondary to acute fractures. Patient declines waiting to Ct chest. Patient voiced her understanding of my concerns and states that she will not stay. Pt understands and recalls risks of leaving against medical advice, including death from worsening right pulmonary disease. Pt states that pt will see PMD. Pt advised to return to the ED if pt feels worse.

## 2017-09-12 NOTE — ED ADULT NURSE NOTE - OBJECTIVE STATEMENT
Pt A&Ox4 c/o right rib pain at this time 9/10, pt states fell on Sun on cement steps [facility] and [facility]. Pt does not take blood thinners. has no bruising or obv deformities. Pt resting comfortably, VSS, no signs of distress at this time, #20 to RAC blood sent to lab, Safety maintained.

## 2017-09-14 ENCOUNTER — TRANSCRIPTION ENCOUNTER (OUTPATIENT)
Age: 82
End: 2017-09-14

## 2017-09-28 NOTE — ED PROVIDER NOTE - CROS ED RESP ALL NEG
PREOPERATIVE DIAGNOSIS: Left knee osteoarthritis. POSTOPERATIVE DIAGNOSIS: Left knee osteoarthritis. PROCEDURE PERFORMED: Cemented left total knee arthroplasty. SURGEON:  Surinder Landa M.D. FIRST ASSISTANT:  Katerina Au PA-C.    ANESTHESIA: Spinal the distal femoral condyles in the trial template. Trial components were removed. Bony surfaces were irrigated and dried. Final components were precoated with cement which had been mixed under vacuum conditions. The bony surfaces were precoated as well.  Co - - -

## 2017-12-06 ENCOUNTER — INPATIENT (INPATIENT)
Facility: HOSPITAL | Age: 82
LOS: 5 days | Discharge: HOME HEALTH | End: 2017-12-12
Attending: EMERGENCY MEDICINE | Admitting: INTERNAL MEDICINE
Payer: MEDICARE

## 2017-12-06 VITALS
OXYGEN SATURATION: 96 % | HEART RATE: 96 BPM | WEIGHT: 119.93 LBS | HEIGHT: 64 IN | TEMPERATURE: 98 F | RESPIRATION RATE: 24 BRPM | SYSTOLIC BLOOD PRESSURE: 177 MMHG | DIASTOLIC BLOOD PRESSURE: 101 MMHG

## 2017-12-06 DIAGNOSIS — Z90.721 ACQUIRED ABSENCE OF OVARIES, UNILATERAL: Chronic | ICD-10-CM

## 2017-12-06 DIAGNOSIS — Z90.89 ACQUIRED ABSENCE OF OTHER ORGANS: Chronic | ICD-10-CM

## 2017-12-06 PROCEDURE — 71010: CPT | Mod: 26

## 2017-12-06 PROCEDURE — 93010 ELECTROCARDIOGRAM REPORT: CPT

## 2017-12-06 RX ORDER — VANCOMYCIN HCL 1 G
1000 VIAL (EA) INTRAVENOUS ONCE
Qty: 0 | Refills: 0 | Status: COMPLETED | OUTPATIENT
Start: 2017-12-06 | End: 2017-12-07

## 2017-12-06 RX ORDER — SODIUM CHLORIDE 9 MG/ML
1500 INJECTION INTRAMUSCULAR; INTRAVENOUS; SUBCUTANEOUS ONCE
Qty: 0 | Refills: 0 | Status: COMPLETED | OUTPATIENT
Start: 2017-12-06 | End: 2017-12-06

## 2017-12-06 RX ORDER — CEFEPIME 1 G/1
2000 INJECTION, POWDER, FOR SOLUTION INTRAMUSCULAR; INTRAVENOUS ONCE
Qty: 0 | Refills: 0 | Status: COMPLETED | OUTPATIENT
Start: 2017-12-06 | End: 2017-12-06

## 2017-12-06 NOTE — ED ADULT TRIAGE NOTE - CHIEF COMPLAINT QUOTE
SOB, diagnosed with PNA yesterday per EMS. Has taken two doses of levaquin so far. Worsening SOB since yesterday. + chest pain and productive cough. Denies fever.

## 2017-12-07 DIAGNOSIS — Z90.49 ACQUIRED ABSENCE OF OTHER SPECIFIED PARTS OF DIGESTIVE TRACT: Chronic | ICD-10-CM

## 2017-12-07 LAB
ALBUMIN SERPL ELPH-MCNC: 3.5 G/DL — SIGNIFICANT CHANGE UP (ref 3.3–5)
ALP SERPL-CCNC: 76 U/L — SIGNIFICANT CHANGE UP (ref 40–120)
ALT FLD-CCNC: 19 U/L — SIGNIFICANT CHANGE UP (ref 12–78)
ANION GAP SERPL CALC-SCNC: 7 MMOL/L — SIGNIFICANT CHANGE UP (ref 5–17)
ANION GAP SERPL CALC-SCNC: 8 MMOL/L — SIGNIFICANT CHANGE UP (ref 5–17)
APTT BLD: 29.1 SEC — SIGNIFICANT CHANGE UP (ref 27.5–37.4)
AST SERPL-CCNC: 22 U/L — SIGNIFICANT CHANGE UP (ref 15–37)
BASOPHILS # BLD AUTO: 0 K/UL — SIGNIFICANT CHANGE UP (ref 0–0.2)
BASOPHILS # BLD AUTO: 0.1 K/UL — SIGNIFICANT CHANGE UP (ref 0–0.2)
BASOPHILS NFR BLD AUTO: 0.6 % — SIGNIFICANT CHANGE UP (ref 0–2)
BASOPHILS NFR BLD AUTO: 0.8 % — SIGNIFICANT CHANGE UP (ref 0–2)
BILIRUB SERPL-MCNC: 0.4 MG/DL — SIGNIFICANT CHANGE UP (ref 0.2–1.2)
BUN SERPL-MCNC: 14 MG/DL — SIGNIFICANT CHANGE UP (ref 7–23)
BUN SERPL-MCNC: 17 MG/DL — SIGNIFICANT CHANGE UP (ref 7–23)
CALCIUM SERPL-MCNC: 7.7 MG/DL — LOW (ref 8.5–10.1)
CALCIUM SERPL-MCNC: 8.6 MG/DL — SIGNIFICANT CHANGE UP (ref 8.5–10.1)
CHLORIDE SERPL-SCNC: 103 MMOL/L — SIGNIFICANT CHANGE UP (ref 96–108)
CHLORIDE SERPL-SCNC: 98 MMOL/L — SIGNIFICANT CHANGE UP (ref 96–108)
CO2 SERPL-SCNC: 26 MMOL/L — SIGNIFICANT CHANGE UP (ref 22–31)
CO2 SERPL-SCNC: 28 MMOL/L — SIGNIFICANT CHANGE UP (ref 22–31)
CREAT SERPL-MCNC: 0.56 MG/DL — SIGNIFICANT CHANGE UP (ref 0.5–1.3)
CREAT SERPL-MCNC: 0.83 MG/DL — SIGNIFICANT CHANGE UP (ref 0.5–1.3)
EOSINOPHIL # BLD AUTO: 0 K/UL — SIGNIFICANT CHANGE UP (ref 0–0.5)
EOSINOPHIL # BLD AUTO: 0 K/UL — SIGNIFICANT CHANGE UP (ref 0–0.5)
EOSINOPHIL NFR BLD AUTO: 0.1 % — SIGNIFICANT CHANGE UP (ref 0–6)
EOSINOPHIL NFR BLD AUTO: 0.1 % — SIGNIFICANT CHANGE UP (ref 0–6)
GLUCOSE SERPL-MCNC: 111 MG/DL — HIGH (ref 70–99)
GLUCOSE SERPL-MCNC: 117 MG/DL — HIGH (ref 70–99)
HCT VFR BLD CALC: 36.6 % — SIGNIFICANT CHANGE UP (ref 34.5–45)
HCT VFR BLD CALC: 44.6 % — SIGNIFICANT CHANGE UP (ref 34.5–45)
HGB BLD-MCNC: 12.7 G/DL — SIGNIFICANT CHANGE UP (ref 11.5–15.5)
HGB BLD-MCNC: 15.3 G/DL — SIGNIFICANT CHANGE UP (ref 11.5–15.5)
INR BLD: 1.28 RATIO — HIGH (ref 0.88–1.16)
LACTATE SERPL-SCNC: 1.6 MMOL/L — SIGNIFICANT CHANGE UP (ref 0.7–2)
LACTATE SERPL-SCNC: 2.4 MMOL/L — HIGH (ref 0.7–2)
LYMPHOCYTES # BLD AUTO: 1.2 K/UL — SIGNIFICANT CHANGE UP (ref 1–3.3)
LYMPHOCYTES # BLD AUTO: 1.3 K/UL — SIGNIFICANT CHANGE UP (ref 1–3.3)
LYMPHOCYTES # BLD AUTO: 14.5 % — SIGNIFICANT CHANGE UP (ref 13–44)
LYMPHOCYTES # BLD AUTO: 20.6 % — SIGNIFICANT CHANGE UP (ref 13–44)
MAGNESIUM SERPL-MCNC: 2 MG/DL — SIGNIFICANT CHANGE UP (ref 1.6–2.6)
MCHC RBC-ENTMCNC: 33.1 PG — SIGNIFICANT CHANGE UP (ref 27–34)
MCHC RBC-ENTMCNC: 33.2 PG — SIGNIFICANT CHANGE UP (ref 27–34)
MCHC RBC-ENTMCNC: 34.3 GM/DL — SIGNIFICANT CHANGE UP (ref 32–36)
MCHC RBC-ENTMCNC: 34.7 GM/DL — SIGNIFICANT CHANGE UP (ref 32–36)
MCV RBC AUTO: 95.7 FL — SIGNIFICANT CHANGE UP (ref 80–100)
MCV RBC AUTO: 96.5 FL — SIGNIFICANT CHANGE UP (ref 80–100)
MONOCYTES # BLD AUTO: 0.7 K/UL — SIGNIFICANT CHANGE UP (ref 0–0.9)
MONOCYTES # BLD AUTO: 0.9 K/UL — SIGNIFICANT CHANGE UP (ref 0–0.9)
MONOCYTES NFR BLD AUTO: 10.8 % — SIGNIFICANT CHANGE UP (ref 2–14)
MONOCYTES NFR BLD AUTO: 11.1 % — SIGNIFICANT CHANGE UP (ref 2–14)
NEUTROPHILS # BLD AUTO: 4.2 K/UL — SIGNIFICANT CHANGE UP (ref 1.8–7.4)
NEUTROPHILS # BLD AUTO: 6.2 K/UL — SIGNIFICANT CHANGE UP (ref 1.8–7.4)
NEUTROPHILS NFR BLD AUTO: 67.4 % — SIGNIFICANT CHANGE UP (ref 43–77)
NEUTROPHILS NFR BLD AUTO: 74 % — SIGNIFICANT CHANGE UP (ref 43–77)
NT-PROBNP SERPL-SCNC: 482 PG/ML — HIGH (ref 0–450)
PLATELET # BLD AUTO: 189 K/UL — SIGNIFICANT CHANGE UP (ref 150–400)
PLATELET # BLD AUTO: 201 K/UL — SIGNIFICANT CHANGE UP (ref 150–400)
POTASSIUM SERPL-MCNC: 3.2 MMOL/L — LOW (ref 3.5–5.3)
POTASSIUM SERPL-MCNC: 3.4 MMOL/L — LOW (ref 3.5–5.3)
POTASSIUM SERPL-SCNC: 3.2 MMOL/L — LOW (ref 3.5–5.3)
POTASSIUM SERPL-SCNC: 3.4 MMOL/L — LOW (ref 3.5–5.3)
PROT SERPL-MCNC: 7.4 GM/DL — SIGNIFICANT CHANGE UP (ref 6–8.3)
PROTHROM AB SERPL-ACNC: 13.9 SEC — HIGH (ref 9.8–12.7)
RAPID RVP RESULT: DETECTED
RBC # BLD: 3.82 M/UL — SIGNIFICANT CHANGE UP (ref 3.8–5.2)
RBC # BLD: 4.62 M/UL — SIGNIFICANT CHANGE UP (ref 3.8–5.2)
RBC # FLD: 11.8 % — SIGNIFICANT CHANGE UP (ref 10.3–14.5)
RBC # FLD: 11.9 % — SIGNIFICANT CHANGE UP (ref 10.3–14.5)
RSV RNA SPEC QL NAA+PROBE: DETECTED
SODIUM SERPL-SCNC: 134 MMOL/L — LOW (ref 135–145)
SODIUM SERPL-SCNC: 136 MMOL/L — SIGNIFICANT CHANGE UP (ref 135–145)
WBC # BLD: 6.2 K/UL — SIGNIFICANT CHANGE UP (ref 3.8–10.5)
WBC # BLD: 8.3 K/UL — SIGNIFICANT CHANGE UP (ref 3.8–10.5)
WBC # FLD AUTO: 6.2 K/UL — SIGNIFICANT CHANGE UP (ref 3.8–10.5)
WBC # FLD AUTO: 8.3 K/UL — SIGNIFICANT CHANGE UP (ref 3.8–10.5)

## 2017-12-07 PROCEDURE — 71010: CPT | Mod: 26

## 2017-12-07 PROCEDURE — 99285 EMERGENCY DEPT VISIT HI MDM: CPT

## 2017-12-07 RX ORDER — ACETAMINOPHEN 500 MG
650 TABLET ORAL EVERY 6 HOURS
Qty: 0 | Refills: 0 | Status: DISCONTINUED | OUTPATIENT
Start: 2017-12-07 | End: 2017-12-12

## 2017-12-07 RX ORDER — IPRATROPIUM/ALBUTEROL SULFATE 18-103MCG
3 AEROSOL WITH ADAPTER (GRAM) INHALATION ONCE
Qty: 0 | Refills: 0 | Status: COMPLETED | OUTPATIENT
Start: 2017-12-07 | End: 2017-12-07

## 2017-12-07 RX ORDER — DEXTROSE MONOHYDRATE, SODIUM CHLORIDE, AND POTASSIUM CHLORIDE 50; .745; 4.5 G/1000ML; G/1000ML; G/1000ML
1000 INJECTION, SOLUTION INTRAVENOUS
Qty: 0 | Refills: 0 | Status: DISCONTINUED | OUTPATIENT
Start: 2017-12-07 | End: 2017-12-08

## 2017-12-07 RX ORDER — AZITHROMYCIN 500 MG/1
500 TABLET, FILM COATED ORAL EVERY 24 HOURS
Qty: 0 | Refills: 0 | Status: DISCONTINUED | OUTPATIENT
Start: 2017-12-07 | End: 2017-12-07

## 2017-12-07 RX ORDER — CEFTRIAXONE 500 MG/1
1 INJECTION, POWDER, FOR SOLUTION INTRAMUSCULAR; INTRAVENOUS EVERY 24 HOURS
Qty: 0 | Refills: 0 | Status: DISCONTINUED | OUTPATIENT
Start: 2017-12-07 | End: 2017-12-07

## 2017-12-07 RX ORDER — ALBUTEROL 90 UG/1
1 AEROSOL, METERED ORAL EVERY 4 HOURS
Qty: 0 | Refills: 0 | Status: DISCONTINUED | OUTPATIENT
Start: 2017-12-07 | End: 2017-12-12

## 2017-12-07 RX ORDER — ONDANSETRON 8 MG/1
4 TABLET, FILM COATED ORAL EVERY 6 HOURS
Qty: 0 | Refills: 0 | Status: DISCONTINUED | OUTPATIENT
Start: 2017-12-07 | End: 2017-12-12

## 2017-12-07 RX ORDER — SODIUM CHLORIDE 9 MG/ML
1000 INJECTION INTRAMUSCULAR; INTRAVENOUS; SUBCUTANEOUS
Qty: 0 | Refills: 0 | Status: DISCONTINUED | OUTPATIENT
Start: 2017-12-07 | End: 2017-12-07

## 2017-12-07 RX ORDER — ALBUTEROL 90 UG/1
2.5 AEROSOL, METERED ORAL EVERY 6 HOURS
Qty: 0 | Refills: 0 | Status: DISCONTINUED | OUTPATIENT
Start: 2017-12-07 | End: 2017-12-12

## 2017-12-07 RX ORDER — FAMOTIDINE 10 MG/ML
20 INJECTION INTRAVENOUS DAILY
Qty: 0 | Refills: 0 | Status: DISCONTINUED | OUTPATIENT
Start: 2017-12-07 | End: 2017-12-12

## 2017-12-07 RX ORDER — AMLODIPINE BESYLATE 2.5 MG/1
2.5 TABLET ORAL DAILY
Qty: 0 | Refills: 0 | Status: DISCONTINUED | OUTPATIENT
Start: 2017-12-07 | End: 2017-12-09

## 2017-12-07 RX ORDER — CEFTRIAXONE 500 MG/1
1000 INJECTION, POWDER, FOR SOLUTION INTRAMUSCULAR; INTRAVENOUS EVERY 24 HOURS
Qty: 0 | Refills: 0 | Status: DISCONTINUED | OUTPATIENT
Start: 2017-12-07 | End: 2017-12-07

## 2017-12-07 RX ORDER — POTASSIUM CHLORIDE 20 MEQ
40 PACKET (EA) ORAL ONCE
Qty: 0 | Refills: 0 | Status: COMPLETED | OUTPATIENT
Start: 2017-12-07 | End: 2017-12-07

## 2017-12-07 RX ORDER — HYDRALAZINE HCL 50 MG
10 TABLET ORAL ONCE
Qty: 0 | Refills: 0 | Status: COMPLETED | OUTPATIENT
Start: 2017-12-07 | End: 2017-12-07

## 2017-12-07 RX ADMIN — AMLODIPINE BESYLATE 2.5 MILLIGRAM(S): 2.5 TABLET ORAL at 04:03

## 2017-12-07 RX ADMIN — Medication 10 MILLIGRAM(S): at 12:38

## 2017-12-07 RX ADMIN — Medication 250 MILLIGRAM(S): at 01:58

## 2017-12-07 RX ADMIN — SODIUM CHLORIDE 75 MILLILITER(S): 9 INJECTION INTRAMUSCULAR; INTRAVENOUS; SUBCUTANEOUS at 05:17

## 2017-12-07 RX ADMIN — Medication 40 MILLIEQUIVALENT(S): at 05:14

## 2017-12-07 RX ADMIN — SODIUM CHLORIDE 1500 MILLILITER(S): 9 INJECTION INTRAMUSCULAR; INTRAVENOUS; SUBCUTANEOUS at 00:00

## 2017-12-07 RX ADMIN — CEFEPIME 100 MILLIGRAM(S): 1 INJECTION, POWDER, FOR SOLUTION INTRAMUSCULAR; INTRAVENOUS at 00:51

## 2017-12-07 RX ADMIN — DEXTROSE MONOHYDRATE, SODIUM CHLORIDE, AND POTASSIUM CHLORIDE 75 MILLILITER(S): 50; .745; 4.5 INJECTION, SOLUTION INTRAVENOUS at 21:57

## 2017-12-07 RX ADMIN — Medication 3 MILLILITER(S): at 03:16

## 2017-12-07 RX ADMIN — CEFTRIAXONE 1000 MILLIGRAM(S): 500 INJECTION, POWDER, FOR SOLUTION INTRAMUSCULAR; INTRAVENOUS at 12:39

## 2017-12-07 RX ADMIN — AZITHROMYCIN 255 MILLIGRAM(S): 500 TABLET, FILM COATED ORAL at 05:13

## 2017-12-07 NOTE — CONSULT NOTE ADULT - ASSESSMENT
89 y.o. female with PMH hiatal hernia, acid reflux admitted 12/6 for evaluation of 2 day history cough productive of yellow sputum, malaise, and overall weakness. Patient denies sick contact, states she had a flu shot; was asking to be discharged home.  1. Patient admitted with RSV upper respiratory tract infection  - continue respiratory droplet isolation  - iv hydration and supportive care   - serial cbc and monitor temperature  - oxygen and nebs as needed  - no need for antibiotics as no superimposed bacterial pnuemonia  - agree with speech and swallow evaluation  Will follow

## 2017-12-07 NOTE — SWALLOW BEDSIDE ASSESSMENT ADULT - PHARYNGEAL PHASE
Swallow trigger timely with adequate laryngeal lift on palpation during swallow trials. No behavioral aspiration signs exhibited./Within functional limits Within functional limits/Swallow trigger was timely with adequate laryngeal lift on palpation during swallow trials. No behavioral aspiration signs exhibited.

## 2017-12-07 NOTE — SWALLOW BEDSIDE ASSESSMENT ADULT - SLP GENERAL OBSERVATIONS
Pt was alert and interactive. She was anxious at times and loquacious.  She was able to verbalize intents without a primary speech-language pathology. Pt denied Dysphagia or aspiration signs when asked.

## 2017-12-07 NOTE — ED ADULT NURSE REASSESSMENT NOTE - NS ED NURSE REASSESS COMMENT FT1
Patient remains as previously assessed. Hypertensive, MD Leyva notified, IV hydralazine administered as prescribed. Awaiting S/S consult. IVF infusing as prescribed. Isolation precautions maintained, safety & comfort measures in place. Will continue to monitor.

## 2017-12-07 NOTE — SWALLOW BEDSIDE ASSESSMENT ADULT - SWALLOW EVAL: RECOMMENDED DIET
THERE ARE NO OVERT OROPHARYNGEAL SWALLOWING CONTRAINDICATIONS EVIDENT FOR BEING ON A REGULAR TEXTURE DIET WITH THIN LIQUIDS AS SHE TOLERATES SAME FROM AN OROPHARYNGEAL SWALLOWING STANCE ON EXAM.

## 2017-12-07 NOTE — CONSULT NOTE ADULT - SUBJECTIVE AND OBJECTIVE BOX
HPI:  89 y.o. female with PMH hiatal hernia, acid reflux admitted 12/6 for evaluation of 2 day history cough productive of yellow sputum, malaise, and overall weakness. Patient denies sick contact, states she had a flu shot; was asking to be discharged home.      PMH: as above  PSH: as above  Meds: per reconcilation sheet, noted below  MEDICATIONS  (STANDING):  ALBUTerol    90 MICROgram(s) HFA Inhaler 1 Puff(s) Inhalation every 4 hours  amLODIPine   Tablet 2.5 milliGRAM(s) Oral daily  famotidine    Tablet 20 milliGRAM(s) Oral daily  sodium chloride 0.9% with potassium chloride 20 mEq/L 1000 milliLiter(s) (75 mL/Hr) IV Continuous <Continuous>    MEDICATIONS  (PRN):  acetaminophen   Tablet 650 milliGRAM(s) Oral every 6 hours PRN For Temp greater than 38 C (100.4 F), mild pain, HA  ALBUTerol    0.083% 2.5 milliGRAM(s) Nebulizer every 6 hours PRN Shortness of Breath and/or Wheezing  ondansetron Injectable 4 milliGRAM(s) IV Push every 6 hours PRN Nausea    Allergies    codeine (Unknown)  Medrol (Unknown)  morphine (Unknown)  opium (Unknown)  predniSONE (Unknown)    Intolerances      Social: no smoking, no alcohol, no illegal drugs; no recent travel, no exposure to TB  FAMILY HISTORY:  No pertinent family history in first degree relatives    ROS: the patient has no fever, no chills, no HA, no dizziness, no sore throat, no blurry vision, no CP, no palpitations,  no abdominal pain, no diarrhea, no N/V, no dysuria, no leg pain, no claudication, no rash, no joint aches, no rectal pain or bleeding, no night sweats  Vital Signs Last 24 Hrs  T(C): 36.8 (07 Dec 2017 14:58), Max: 37.6 (07 Dec 2017 03:15)  T(F): 98.3 (07 Dec 2017 14:58), Max: 99.6 (07 Dec 2017 03:15)  HR: 100 (07 Dec 2017 14:58) (94 - 107)  BP: 158/70 (07 Dec 2017 14:58) (141/72 - 189/88)  BP(mean): --  RR: 22 (07 Dec 2017 14:58) (22 - 24)  SpO2: 100% (07 Dec 2017 14:58) (94% - 100%)  Daily Height in cm: 162.56 (06 Dec 2017 23:00)    Daily   Constitutional: frail looking  HEENT: NC/AT, EOMI, PERRLA  Neck: supple  Respiratory: bilateral rhonchi  Cardiovascular: S1S2 regular, no murmurs  Abdomen: soft, not tender, not distended, positive BS  Genitourinary: deferred  Rectal: deferred  Musculoskeletal: no muscle tenderness, no joint swelling or tenderness  Neurological: AxOx3, moving all extremities, no focal deficits  Skin: no rashes                          12.7   6.2   )-----------( 189      ( 07 Dec 2017 05:13 )             36.6     12-07    136  |  103  |  14  ----------------------------<  111<H>  3.2<L>   |  26  |  0.56    Ca    7.7<L>      07 Dec 2017 05:13  Mg     2.0     12-06    TPro  7.4  /  Alb  3.5  /  TBili  0.4  /  DBili  x   /  AST  22  /  ALT  19  /  AlkPhos  76  12-06     LIVER FUNCTIONS - ( 06 Dec 2017 23:40 )  Alb: 3.5 g/dL / Pro: 7.4 gm/dL / ALK PHOS: 76 U/L / ALT: 19 U/L / AST: 22 U/L / GGT: x           Rapid Respiratory Viral Panel (12.06.17 @ 23:40)    Rapid RVP Result: Detected: The [employer] RVP Rapid uses polymerase chain reaction (PCR) and melt  curve analysis to screen for adenovirus; coronavirus HKU1, NL63, 229E,  OC43; human metapneumovirus (hMPV); human enterovirus/rhinovirus  (Entero/RV); influenza A; influenza A/H1;influenza A/H3; influenza  A/H1-2009; influenza B; parainfluenza viruses 1, 2, 3, 4; respiratory  syncytial virus; Bordetella pertussis; Mycoplasma pneumoniae; and  Chlamydophila pneumoniae.    Resp Syncytial Virus (RapRVP): Detected: Test Name:rsv  Called by:Vasiliy  Called to:Morelia RN  Read back 2 Pt IDs:y Read back values:y Date/Tm:12/07/17 02:32          Radiology:< from: Xray Chest 1 View AP/PA. (12.07.17 @ 09:25) >    EXAM:  CHEST SINGLE VIEW FRONTAL                          EXAM:  CHEST SINGLE VIEW FRONTAL                            PROCEDURE DATE:  12/06/2017          INTERPRETATION:  Clinical information: Shortness of breath    AP view of the chest from December 06, 2017, 2354 hours    COMPARISON: March 28, 2017    FINDINGS: The cardiac, hilar and mediastinal contours are unremarkable.   The lungs are clear. There is no pleural effusion or pneumothorax. There   is mild thoracic scoliosis, convex right.    December 07, 2017, 0859 hours:    There is no change.    IMPRESSION:    Clear lungs    No change since March 28, 2017    < end of copied text >      Advanced directive addressed: full resuscitation

## 2017-12-07 NOTE — H&P ADULT - ASSESSMENT
89 y.o. female with PMH hiatal hernia, acid reflux presents with cough and fever. Pt from home reports feeling of nasal congestion, productive cough of yellowish sputum and shortness of breath with dyspnea on exertion. Pt denies fever, sore throat, sick contacts, abd pain, diarrhea, or dysuria. Pt baseline walks with cane or walker at home. Pt states her son-in-law Sabino brought her to ED. She doesn't want me to call them now.    #severe sepsis (lactate 2.4->1.6, tachycardia, tachypnea) due to viral infection (RSV+) and/or CAP  -admit to hospitalist service  -pulse ox monitoring and supplemental oxygen  -pt received levaquin from urgent care 2 days ago and vanco/cefepime in ED. Cont CAP coverage for now.  -ID consult  -f/u cultures  -check urine legionella  -iv hydration  -prn albuterol  -isolation precautions  -pt allergic to medrol and prednisone    #elevated BP without HTN vs undiagnosed HTN  -on prior admission, /87, 159/59  -start norvasc 2.5mg daily and monitor vitals    #hypokalemia  -supplement    #DVT ppx  -SCDs

## 2017-12-07 NOTE — SWALLOW BEDSIDE ASSESSMENT ADULT - COMMENTS
The patient was admitted to  with fever and a cough, Hospital course is notable for hypokalemia, elevated lactate, sepsis, cough, and RSV. This profile is superimposed upon a prior history of HTN, HLD, hiatal hernia and GERD, Other past medical history is as described below.

## 2017-12-07 NOTE — SWALLOW BEDSIDE ASSESSMENT ADULT - SWALLOW EVAL: DIAGNOSIS
1) The patient demonstrates Oropharyngeal Swallowing which subjectively appears to be stable and within functional parameters for age. No behavioral aspiration signs exhibited on exam,  2) Pt is able to verbalize during communicative probes/in conversation. When verbalizing, her utterances were intelligible and linguistically intact. No dysarthria, verbal apraxia, or aphasia were evident on exam. At reported communicative baseline.

## 2017-12-07 NOTE — ED ADULT NURSE REASSESSMENT NOTE - COMFORT CARE
plan of care explained/repositioned/side rails up
side rails up/repositioned/warm blanket provided/plan of care explained/wait time explained

## 2017-12-07 NOTE — ED ADULT NURSE NOTE - OBJECTIVE STATEMENT
color pale skin warm and dry.   c/o productive cough and shortness of breath since Friday.  Patient states she was using nasal spray with no relief.  Patient currently has no primary care physician.

## 2017-12-07 NOTE — H&P ADULT - HISTORY OF PRESENT ILLNESS
89 y.o. female with PMH hiatal hernia, acid reflux presents with cough and fever. Pt from home reports feeling of nasal congestion, productive cough of yellowish sputum and shortness of breath with dyspnea on exertion. Pt denies fever, sore throat, sick contacts, abd pain, diarrhea, or dysuria. Pt baseline walks with cane or walker at home. Pt states her son-in-law Sabino brought her to ED. She doesn't want me to call them now.    PMH: as above  PSH: tonsillectomy, appendectomy, oophorectomy  Social Hx: denies tobacco, rare EtOH, no drugs; Occupation: retiree LPN, lives alone  Family Hx: noncontrib to current presentation  ROS: per HPI

## 2017-12-07 NOTE — SWALLOW BEDSIDE ASSESSMENT ADULT - ASR SWALLOW RECOMMEND DIAG
DEFER MBS AS PT APPEARED CLINICALLY TOLERANT OF SOLIDS/THIN LIQUIDS FROM AN OROPHARYNGEAL SWALLOWING STANCE AND PT DID NOT APPEAR TO BE ASPIRATING ON CLINICAL EXAM,

## 2017-12-07 NOTE — SWALLOW BEDSIDE ASSESSMENT ADULT - ORAL PHASE
Bolus formation/transfer were mechanically functional for age without an overt oral motor focality. She cleared oral debris on own after age acceptable piecemeal deglutition./Within functional limits

## 2017-12-07 NOTE — PROGRESS NOTE ADULT - SUBJECTIVE AND OBJECTIVE BOX
cc: sob  hpi: 89y female presents from home w/ cough and fever.  Pt found to be + RSV and was admitted early this morning.    chart, labs, imaging reviewed.  Called by nurses early b/c pt coughing taking potassium supplement.  Repeat cxr unchanged.  O2 sats-   Pt     Vital Signs Last 24 Hrs  T(C): 37.2 (07 Dec 2017 12:07), Max: 37.6 (07 Dec 2017 03:15)  T(F): 98.9 (07 Dec 2017 12:07), Max: 99.6 (07 Dec 2017 03:15)  HR: 97 (07 Dec 2017 12:21) (94 - 107)  BP: 184/90 (07 Dec 2017 12:21) (141/72 - 189/88)  BP(mean): --  RR: 22 (07 Dec 2017 12:07) (22 - 24)  SpO2: 98% (07 Dec 2017 12:07) (94% - 100%)        LABS: All Labs Reviewed:                        12.7   6.2   )-----------( 189      ( 07 Dec 2017 05:13 )             36.6     12-07    136  |  103  |  14  ----------------------------<  111<H>  3.2<L>   |  26  |  0.56    Ca    7.7<L>      07 Dec 2017 05:13  Mg     2.0     12-06    TPro  7.4  /  Alb  3.5  /  TBili  0.4  /  DBili  x   /  AST  22  /  ALT  19  /  AlkPhos  76  12-06    PT/INR - ( 06 Dec 2017 23:40 )   PT: 13.9 sec;   INR: 1.28 ratio         PTT - ( 06 Dec 2017 23:40 )  PTT:29.1 sec      < from: Xray Chest 1 View AP/PA. (12.07.17 @ 09:25) >  IMPRESSION:    Clear lungs    No change since March 28, 2017    < end of copied text >    MEDICATIONS  (STANDING):  ALBUTerol    90 MICROgram(s) HFA Inhaler 1 Puff(s) Inhalation every 4 hours  amLODIPine   Tablet 2.5 milliGRAM(s) Oral daily  famotidine    Tablet 20 milliGRAM(s) Oral daily  sodium chloride 0.9% with potassium chloride 20 mEq/L 1000 milliLiter(s) (75 mL/Hr) IV Continuous <Continuous>    MEDICATIONS  (PRN):  acetaminophen   Tablet 650 milliGRAM(s) Oral every 6 hours PRN For Temp greater than 38 C (100.4 F), mild pain, HA  ALBUTerol    0.083% 2.5 milliGRAM(s) Nebulizer every 6 hours PRN Shortness of Breath and/or Wheezing  ondansetron Injectable 4 milliGRAM(s) IV Push every 6 hours PRN Nausea    Assessment and Plan:   89y female w/     *cough, dyspnea due to RSV URI  *sepsis due to RSV URI  - no fevers documented here and no leukocytosis but pt had elevated HR and RR  and elevated lactate now normal  - pt did receive abx for possible pna but imaging unremarkable - d/c antibiotics  - continue IVF  -good O2 sats on room air and none documented below -   - speech swallow eval     *hypokalemia  - replete w/ IVF     *htn  - iv meds prn if can't tolerate po    *dvt px cc: sob  hpi: 89y female presents from home w/ cough and fever.  Pt found to be + RSV and was admitted early this morning.    chart, labs, imaging reviewed.  Called by nurses early b/c pt coughing taking potassium supplement.  Repeat cxr unchanged.  O2 sats remain stable on room air.   Pt c/o cough, nonproductive.  Denies cp, sob.  No sick contacts.  Lives w/ daughter.  Ambulates w/ cane at baseline.    Upset b/c she's hungry.     Vital Signs Last 24 Hrs  T(C): 37.2 (07 Dec 2017 12:07), Max: 37.6 (07 Dec 2017 03:15)  T(F): 98.9 (07 Dec 2017 12:07), Max: 99.6 (07 Dec 2017 03:15)  HR: 97 (07 Dec 2017 12:21) (94 - 107)  BP: 184/90 (07 Dec 2017 12:21) (141/72 - 189/88)  BP(mean): --  RR: 22 (07 Dec 2017 12:07) (22 - 24)  SpO2: 98% (07 Dec 2017 12:07) (94% - 100%)      PHYSICAL EXAM:    General: elderly female,  NAD, comfortably seated in bed, coughing,  not on supplemental O2   Neuro: AAOx3, no focal deficits  HEENT: NCAT, PERRLA, EOMI, dry MM  Neck: Soft and supple, No JVD  Respiratory: coarse b/l lung sounds, mild expiratory wheeze  Cardiovascular: S1 and S2, RRR  Gastrointestinal: +BS, soft  Extremities/MSK: full rom, no edema  Vascular: 2+ peripheral pulses  Skin: No rashes      LABS: All Labs Reviewed:                        12.7   6.2   )-----------( 189      ( 07 Dec 2017 05:13 )             36.6     12-07    136  |  103  |  14  ----------------------------<  111<H>  3.2<L>   |  26  |  0.56    Ca    7.7<L>      07 Dec 2017 05:13  Mg     2.0     12-06    TPro  7.4  /  Alb  3.5  /  TBili  0.4  /  DBili  x   /  AST  22  /  ALT  19  /  AlkPhos  76  12-06    PT/INR - ( 06 Dec 2017 23:40 )   PT: 13.9 sec;   INR: 1.28 ratio         PTT - ( 06 Dec 2017 23:40 )  PTT:29.1 sec      < from: Xray Chest 1 View AP/PA. (12.07.17 @ 09:25) >  IMPRESSION:    Clear lungs    No change since March 28, 2017    < end of copied text >    MEDICATIONS  (STANDING):  ALBUTerol    90 MICROgram(s) HFA Inhaler 1 Puff(s) Inhalation every 4 hours  amLODIPine   Tablet 2.5 milliGRAM(s) Oral daily  famotidine    Tablet 20 milliGRAM(s) Oral daily  sodium chloride 0.9% with potassium chloride 20 mEq/L 1000 milliLiter(s) (75 mL/Hr) IV Continuous <Continuous>    MEDICATIONS  (PRN):  acetaminophen   Tablet 650 milliGRAM(s) Oral every 6 hours PRN For Temp greater than 38 C (100.4 F), mild pain, HA  ALBUTerol    0.083% 2.5 milliGRAM(s) Nebulizer every 6 hours PRN Shortness of Breath and/or Wheezing  ondansetron Injectable 4 milliGRAM(s) IV Push every 6 hours PRN Nausea    Assessment and Plan:   89y female w/     *cough, dyspnea due to RSV URI  *sepsis due to RSV URI  - no fevers documented here and no leukocytosis but pt had elevated HR and RR  and elevated lactate (no acidosis) now normal  - pt did receive abx for possible pna but imaging unremarkable - d/c antibiotics  - continue IVF  -good O2 sats on room air     *hypokalemia  - replete w/ IVF     *htn  - resume home po meds    *dvt px   - lovenox sc  - ambulate

## 2017-12-07 NOTE — SWALLOW BEDSIDE ASSESSMENT ADULT - SWALLOW EVAL: CRITERIA FOR SKILLED INTERVENTION MET
no problems identified which require skilled intervention/No overt need for acute spee/swallow therapy is evident. Thus, WILL NOT ACTIVELY FOLLOW. RECONSULT PRN no problems identified which require skilled intervention/No overt need for acute speech/swallow therapy is evident. Thus, WILL NOT ACTIVELY FOLLOW. RECONSULT PRN

## 2017-12-07 NOTE — ED ADULT NURSE REASSESSMENT NOTE - NS ED NURSE REASSESS COMMENT FT1
Received patient from RASHAD Lema. Pt AxOx4, no stated concerns at this time. Vs stable, cardiac monitoring maintained. Comfort and safety measures maintained. Droplet prec maintained. will cont to monitor.

## 2017-12-07 NOTE — ED ADULT NURSE REASSESSMENT NOTE - NS ED NURSE REASSESS COMMENT FT1
Patient coughing frequently, SpO2 % with supplemental oxygen. Scattered rhonchi with expiratory wheezes to ausculation, diminished breath sounds to bilateral lung bases. Patient agitated, cooperative. Isolation precautions maintained. Safety & comfort measures in place. Will continue to monitor. Patient coughing frequently, SpO2 % with supplemental oxygen. Scattered rhonchi with expiratory wheezes to ausculation, diminished breath sounds to bilateral lung bases. MD Leyva notified, stat CXR ordered per MD. Patient agitated, cooperative. Isolation precautions maintained. Safety & comfort measures in place. Will continue to monitor. Patient coughing frequently, SpO2 % with supplemental oxygen. Scattered rhonchi with expiratory wheezes to ausculation, diminished breath sounds to bilateral lung bases. Patient aspirated potassium tablet this morning per RN Shawna Leyva notified, stat CXR ordered per MD. Aspiration pneumonia per report, not able to view CXR results at this time. Patient agitated, cooperative. Isolation precautions maintained. Safety & comfort measures in place. Will continue to monitor.

## 2017-12-07 NOTE — ED ADULT NURSE REASSESSMENT NOTE - NS ED NURSE REASSESS COMMENT FT1
Pt with episode of coughing while taking medication, MD Srivastava made aware, Speech/swallow eval ordered, Chest xray ordered. Pt resting comfortably at present time. Will continue to monitor.

## 2017-12-07 NOTE — H&P ADULT - NSHPPHYSICALEXAM_GEN_ALL_CORE
Vital Signs Last 24 Hrs  T(C): 37.6 (07 Dec 2017 03:15), Max: 37.6 (07 Dec 2017 03:15)  T(F): 99.6 (07 Dec 2017 03:15), Max: 99.6 (07 Dec 2017 03:15)  HR: 100 (07 Dec 2017 03:15) (94 - 100)  BP: 189/88 (07 Dec 2017 03:15) (177/101 - 189/88)  BP(mean): --  RR: 23 (07 Dec 2017 03:15) (23 - 24)  SpO2: 98% (07 Dec 2017 03:15) (94% - 98%)    GEN: appears uncomfortable, not speak in full sentences  Neuro: AAOx3, nonfocal  HEENT: NC/AT, EOMI  Neck: no thyroidmegaly, no JVD  Cardiovascular: S1S2 present, regular rhythm, no murmur, tachycardia  Respiratory: breath sounds normal bilaterally, no wheezing, no rales, +rhonchi throughout both lung fields  Gastrointestinal: bowel sounds normal, soft, no abdominal tenderness  Musculoskeletal: no muscle tenderness  Extremities: No edema  Skin: No rash

## 2017-12-07 NOTE — ED PROVIDER NOTE - OBJECTIVE STATEMENT
89 yo female with pmh hiatal hernia and acid reflux c/o sob.  Pt states she's been sick with a cold/congestion/cough for at least 4-5 days, seen by a walk in center a few days ago and given Levaquin.  Pt has been feeling increasingly more sob and uncomfortable.  Denies fever.  Pt states she has no doctor because hers retired a few years back and she goes to an urgent care when she is sickly.  She has no prescribed medications.  Patient states she smoked when she was 17, but was never a chronic smoker.  No h/o asthma/COPD or use of inhalers.  Denies cardiac hx.  Non drinker.  Lives at home, alone.

## 2017-12-07 NOTE — ED ADULT NURSE REASSESSMENT NOTE - NS ED NURSE REASSESS COMMENT FT1
patient refused urine testing.  Unable to provide a urine sample at this time and refuses straight catheterization.  Patient is unstable on her feet and short of breath.  Patient provided with warm blankets and positioned for comfort.  Will continue to monitor patient condition.  antibiotics started.

## 2017-12-08 LAB
ANION GAP SERPL CALC-SCNC: 7 MMOL/L — SIGNIFICANT CHANGE UP (ref 5–17)
BUN SERPL-MCNC: 6 MG/DL — LOW (ref 7–23)
CALCIUM SERPL-MCNC: 7.8 MG/DL — LOW (ref 8.5–10.1)
CHLORIDE SERPL-SCNC: 101 MMOL/L — SIGNIFICANT CHANGE UP (ref 96–108)
CO2 SERPL-SCNC: 26 MMOL/L — SIGNIFICANT CHANGE UP (ref 22–31)
CREAT SERPL-MCNC: 0.44 MG/DL — LOW (ref 0.5–1.3)
GLUCOSE SERPL-MCNC: 92 MG/DL — SIGNIFICANT CHANGE UP (ref 70–99)
MAGNESIUM SERPL-MCNC: 1.9 MG/DL — SIGNIFICANT CHANGE UP (ref 1.6–2.6)
POTASSIUM SERPL-MCNC: 3.2 MMOL/L — LOW (ref 3.5–5.3)
POTASSIUM SERPL-SCNC: 3.2 MMOL/L — LOW (ref 3.5–5.3)
SODIUM SERPL-SCNC: 134 MMOL/L — LOW (ref 135–145)

## 2017-12-08 RX ORDER — AMLODIPINE BESYLATE 2.5 MG/1
2.5 TABLET ORAL ONCE
Qty: 0 | Refills: 0 | Status: COMPLETED | OUTPATIENT
Start: 2017-12-08 | End: 2017-12-08

## 2017-12-08 RX ORDER — POTASSIUM CHLORIDE 20 MEQ
40 PACKET (EA) ORAL
Qty: 0 | Refills: 0 | Status: COMPLETED | OUTPATIENT
Start: 2017-12-08 | End: 2017-12-09

## 2017-12-08 RX ORDER — RANITIDINE HYDROCHLORIDE 150 MG/1
10 TABLET, FILM COATED ORAL
Qty: 0 | Refills: 0 | COMMUNITY

## 2017-12-08 RX ADMIN — Medication 40 MILLIEQUIVALENT(S): at 11:56

## 2017-12-08 RX ADMIN — FAMOTIDINE 20 MILLIGRAM(S): 10 INJECTION INTRAVENOUS at 11:56

## 2017-12-08 RX ADMIN — AMLODIPINE BESYLATE 2.5 MILLIGRAM(S): 2.5 TABLET ORAL at 20:58

## 2017-12-08 NOTE — PROGRESS NOTE ADULT - SUBJECTIVE AND OBJECTIVE BOX
cc: sob  hpi: 89y female presents from home w/ cough and fever.  Pt found to be + RSV and was admitted early this morning.    chart, labs, imaging reviewed.  Called by nurses early b/c pt coughing taking potassium supplement.  Repeat cxr unchanged.  O2 sats remain stable on room air.   Pt c/o cough, nonproductive.  Denies cp, sob.  No sick contacts.  Lives w/ daughter.  Ambulates w/ cane at baseline.   Upset b/c she's hungry.     12/8- low grade temps overnight -     Vital Signs Last 24 Hrs  T(C): 37.3 (08 Dec 2017 05:14), Max: 37.4 (07 Dec 2017 23:33)  T(F): 99.1 (08 Dec 2017 05:14), Max: 99.4 (07 Dec 2017 23:33)  HR: 88 (08 Dec 2017 05:14) (88 - 110)  BP: 137/93 (08 Dec 2017 05:14) (137/93 - 166/92)  BP(mean): --  RR: 19 (08 Dec 2017 05:14) (19 - 22)  SpO2: 97% (08 Dec 2017 05:14) (94% - 100%)      PHYSICAL EXAM:    General: elderly female,  NAD, comfortably seated in bed, coughing,  not on supplemental O2   Neuro: AAOx3, no focal deficits  HEENT: NCAT, PERRLA, EOMI, dry MM  Neck: Soft and supple, No JVD  Respiratory: coarse b/l lung sounds, mild expiratory wheeze  Cardiovascular: S1 and S2, RRR  Gastrointestinal: +BS, soft  Extremities/MSK: full rom, no edema  Vascular: 2+ peripheral pulses  Skin: No rashes        LABS: All Labs Reviewed:                        12.7   6.2   )-----------( 189      ( 07 Dec 2017 05:13 )             36.6     12-08    134<L>  |  101  |  6<L>  ----------------------------<  92  3.2<L>   |  26  |  0.44<L>    Ca    7.8<L>      08 Dec 2017 05:46  Mg     1.9     12-08    TPro  7.4  /  Alb  3.5  /  TBili  0.4  /  DBili  x   /  AST  22  /  ALT  19  /  AlkPhos  76  12-06    PT/INR - ( 06 Dec 2017 23:40 )   PT: 13.9 sec;   INR: 1.28 ratio         PTT - ( 06 Dec 2017 23:40 )  PTT:29.1 sec        Culture - Blood (12.06.17 @ 23:40)    Specimen Source: .Blood None    Culture Results:   No growth to date.                             12.7   6.2   )-----------( 189      ( 07 Dec 2017 05:13 )             36.6     12-07    136  |  103  |  14  ----------------------------<  111<H>  3.2<L>   |  26  |  0.56    Ca    7.7<L>      07 Dec 2017 05:13  Mg     2.0     12-06    TPro  7.4  /  Alb  3.5  /  TBili  0.4  /  DBili  x   /  AST  22  /  ALT  19  /  AlkPhos  76  12-06    PT/INR - ( 06 Dec 2017 23:40 )   PT: 13.9 sec;   INR: 1.28 ratio         PTT - ( 06 Dec 2017 23:40 )  PTT:29.1 sec      < from: Xray Chest 1 View AP/PA. (12.07.17 @ 09:25) >  IMPRESSION:    Clear lungs    No change since March 28, 2017    MEDICATIONS  (STANDING):  ALBUTerol    90 MICROgram(s) HFA Inhaler 1 Puff(s) Inhalation every 4 hours  amLODIPine   Tablet 2.5 milliGRAM(s) Oral daily  famotidine    Tablet 20 milliGRAM(s) Oral daily  potassium chloride   Powder 40 milliEquivalent(s) Oral two times a day  sodium chloride 0.9% with potassium chloride 20 mEq/L 1000 milliLiter(s) (75 mL/Hr) IV Continuous <Continuous>    MEDICATIONS  (PRN):  acetaminophen   Tablet 650 milliGRAM(s) Oral every 6 hours PRN For Temp greater than 38 C (100.4 F), mild pain, HA  ALBUTerol    0.083% 2.5 milliGRAM(s) Nebulizer every 6 hours PRN Shortness of Breath and/or Wheezing  ondansetron Injectable 4 milliGRAM(s) IV Push every 6 hours PRN Nausea      Assessment and Plan:   89y female w/     *cough, dyspnea due to RSV URI  *sepsis due to RSV URI  - no fevers documented here and no leukocytosis but pt had elevated HR and RR  and elevated lactate (no acidosis) now normal  - pt did receive abx for possible pna but imaging unremarkable - d/c antibiotics  - continue IVF  -good O2 sats on room air --> transfer to med surg-  no need for continuous O2 monitoring   - ID f/u appreciated     *hypokalemia  - replete w/ IVF     *hyponatremia  - IVF  - repeat bmp     *htn  - resume home po meds    *dvt px   - lovenox sc  - ambulate     PT eval. cc: sob  hpi: 89y female presents from home w/ cough and fever.  Pt found to be + RSV and was admitted early this morning.    chart, labs, imaging reviewed.  Called by nurses early b/c pt coughing taking potassium supplement.  Repeat cxr unchanged.  O2 sats remain stable on room air.   Pt c/o cough, nonproductive.  Denies cp, sob.  No sick contacts.  Lives w/ daughter.  Ambulates w/ cane at baseline.   Upset b/c she's hungry.     12/8- low grade temps overnight - feeling weak and tired.  No appetite.  Upset she is here.     Vital Signs Last 24 Hrs  T(C): 37.3 (08 Dec 2017 05:14), Max: 37.4 (07 Dec 2017 23:33)  T(F): 99.1 (08 Dec 2017 05:14), Max: 99.4 (07 Dec 2017 23:33)  HR: 88 (08 Dec 2017 05:14) (88 - 110)  BP: 137/93 (08 Dec 2017 05:14) (137/93 - 166/92)  BP(mean): --  RR: 19 (08 Dec 2017 05:14) (19 - 22)  SpO2: 97% (08 Dec 2017 05:14) (94% - 100%)      PHYSICAL EXAM:    General: elderly female,  NAD, comfortably seated in chair,  not on supplemental O2   Neuro: AAOx3, no focal deficits  HEENT: NCAT  Neck: Soft and supple  Respiratory: coarse b/l lung sounds  Cardiovascular: S1 and S2, RRR  Gastrointestinal: +BS, soft  Extremities/MSK: full rom, no edema  Vascular: 2+ peripheral pulses  Skin: No rashes      LABS: All Labs Reviewed:                        12.7   6.2   )-----------( 189      ( 07 Dec 2017 05:13 )             36.6     12-08    134<L>  |  101  |  6<L>  ----------------------------<  92  3.2<L>   |  26  |  0.44<L>    Ca    7.8<L>      08 Dec 2017 05:46  Mg     1.9     12-08    TPro  7.4  /  Alb  3.5  /  TBili  0.4  /  DBili  x   /  AST  22  /  ALT  19  /  AlkPhos  76  12-06    PT/INR - ( 06 Dec 2017 23:40 )   PT: 13.9 sec;   INR: 1.28 ratio         PTT - ( 06 Dec 2017 23:40 )  PTT:29.1 sec        Culture - Blood (12.06.17 @ 23:40)    Specimen Source: .Blood None    Culture Results:   No growth to date.                             12.7   6.2   )-----------( 189      ( 07 Dec 2017 05:13 )             36.6     12-07    136  |  103  |  14  ----------------------------<  111<H>  3.2<L>   |  26  |  0.56    Ca    7.7<L>      07 Dec 2017 05:13  Mg     2.0     12-06    TPro  7.4  /  Alb  3.5  /  TBili  0.4  /  DBili  x   /  AST  22  /  ALT  19  /  AlkPhos  76  12-06    PT/INR - ( 06 Dec 2017 23:40 )   PT: 13.9 sec;   INR: 1.28 ratio         PTT - ( 06 Dec 2017 23:40 )  PTT:29.1 sec      < from: Xray Chest 1 View AP/PA. (12.07.17 @ 09:25) >  IMPRESSION:    Clear lungs    No change since March 28, 2017    MEDICATIONS  (STANDING):  ALBUTerol    90 MICROgram(s) HFA Inhaler 1 Puff(s) Inhalation every 4 hours  amLODIPine   Tablet 2.5 milliGRAM(s) Oral daily  famotidine    Tablet 20 milliGRAM(s) Oral daily  potassium chloride   Powder 40 milliEquivalent(s) Oral two times a day  sodium chloride 0.9% with potassium chloride 20 mEq/L 1000 milliLiter(s) (75 mL/Hr) IV Continuous <Continuous>    MEDICATIONS  (PRN):  acetaminophen   Tablet 650 milliGRAM(s) Oral every 6 hours PRN For Temp greater than 38 C (100.4 F), mild pain, HA  ALBUTerol    0.083% 2.5 milliGRAM(s) Nebulizer every 6 hours PRN Shortness of Breath and/or Wheezing  ondansetron Injectable 4 milliGRAM(s) IV Push every 6 hours PRN Nausea      Assessment and Plan:   89y female w/     *cough, dyspnea due to RSV URI  *sepsis due to RSV URI  - no fevers documented here and no leukocytosis but pt had elevated HR and RR  and elevated lactate (no acidosis) now normal  - pt did receive abx for possible pna but imaging unremarkable - d/c antibiotics  - continue IVF  -good O2 sats on room air --> transfer to med surg-  no need for continuous O2 monitoring   - ID f/u appreciated     *hypokalemia  - replete w/ IVF     *hyponatremia  - IVF  - repeat bmp     *htn  - resume home po meds    *dvt px   - lovenox sc  - ambulate     PT eval appreciated - rec AYLIN vs home w/ PT.

## 2017-12-08 NOTE — PHYSICAL THERAPY INITIAL EVALUATION ADULT - MODALITIES TREATMENT COMMENTS
pt left seated in chair post Eval; chair alarm donned; HM, pulse oxym in place; Dr. Leyva present; callbell in reach; pt instructed not to get up alone; call nursing for assist; juan well; denied pain; isolation maintained; RN Bambi made aware pt OOB

## 2017-12-09 LAB
ANION GAP SERPL CALC-SCNC: 4 MMOL/L — LOW (ref 5–17)
BUN SERPL-MCNC: 8 MG/DL — SIGNIFICANT CHANGE UP (ref 7–23)
CALCIUM SERPL-MCNC: 8.5 MG/DL — SIGNIFICANT CHANGE UP (ref 8.5–10.1)
CHLORIDE SERPL-SCNC: 104 MMOL/L — SIGNIFICANT CHANGE UP (ref 96–108)
CO2 SERPL-SCNC: 28 MMOL/L — SIGNIFICANT CHANGE UP (ref 22–31)
CREAT SERPL-MCNC: 0.51 MG/DL — SIGNIFICANT CHANGE UP (ref 0.5–1.3)
GLUCOSE SERPL-MCNC: 80 MG/DL — SIGNIFICANT CHANGE UP (ref 70–99)
POTASSIUM SERPL-MCNC: 4.5 MMOL/L — SIGNIFICANT CHANGE UP (ref 3.5–5.3)
POTASSIUM SERPL-SCNC: 4.5 MMOL/L — SIGNIFICANT CHANGE UP (ref 3.5–5.3)
SODIUM SERPL-SCNC: 136 MMOL/L — SIGNIFICANT CHANGE UP (ref 135–145)

## 2017-12-09 RX ORDER — AMLODIPINE BESYLATE 2.5 MG/1
5 TABLET ORAL DAILY
Qty: 0 | Refills: 0 | Status: DISCONTINUED | OUTPATIENT
Start: 2017-12-09 | End: 2017-12-12

## 2017-12-09 RX ADMIN — FAMOTIDINE 20 MILLIGRAM(S): 10 INJECTION INTRAVENOUS at 12:23

## 2017-12-09 RX ADMIN — AMLODIPINE BESYLATE 2.5 MILLIGRAM(S): 2.5 TABLET ORAL at 00:53

## 2017-12-09 RX ADMIN — Medication 200 MILLIGRAM(S): at 17:08

## 2017-12-09 RX ADMIN — AMLODIPINE BESYLATE 5 MILLIGRAM(S): 2.5 TABLET ORAL at 12:24

## 2017-12-09 RX ADMIN — AMLODIPINE BESYLATE 2.5 MILLIGRAM(S): 2.5 TABLET ORAL at 05:31

## 2017-12-09 RX ADMIN — Medication 40 MILLIEQUIVALENT(S): at 05:31

## 2017-12-09 RX ADMIN — Medication 600 MILLIGRAM(S): at 17:08

## 2017-12-09 NOTE — PROGRESS NOTE ADULT - SUBJECTIVE AND OBJECTIVE BOX
HPI:  89 y.o. female with PMH hiatal hernia, acid reflux admitted 12/6 for evaluation of 2 day history cough productive of yellow sputum, malaise, and overall weakness. Patient denies sick contact, states she had a flu shot; was asking to be discharged home.  Today 12/9 patient still with cough    MEDICATIONS  (STANDING):  ALBUTerol    90 MICROgram(s) HFA Inhaler 1 Puff(s) Inhalation every 4 hours  amLODIPine   Tablet 5 milliGRAM(s) Oral daily  famotidine    Tablet 20 milliGRAM(s) Oral daily    MEDICATIONS  (PRN):  acetaminophen   Tablet 650 milliGRAM(s) Oral every 6 hours PRN For Temp greater than 38 C (100.4 F), mild pain, HA  ALBUTerol    0.083% 2.5 milliGRAM(s) Nebulizer every 6 hours PRN Shortness of Breath and/or Wheezing  ondansetron Injectable 4 milliGRAM(s) IV Push every 6 hours PRN Nausea      Vital Signs Last 24 Hrs  T(C): 36.6 (09 Dec 2017 10:26), Max: 36.9 (08 Dec 2017 17:39)  T(F): 97.8 (09 Dec 2017 10:26), Max: 98.4 (08 Dec 2017 17:39)  HR: 103 (09 Dec 2017 10:26) (86 - 103)  BP: 149/94 (09 Dec 2017 10:26) (149/94 - 195/83)  BP(mean): --  RR: 16 (09 Dec 2017 10:26) (16 - 18)  SpO2: 98% (09 Dec 2017 10:26) (94% - 99%)    Physical Exam:            Constitutional: frail looking  HEENT: NC/AT, EOMI, PERRLA  Neck: supple  Respiratory: bilateral rhonchi  Cardiovascular: S1S2 regular, no murmurs  Abdomen: soft, not tender, not distended, positive BS  Genitourinary: deferred  Rectal: deferred  Musculoskeletal: no muscle tenderness, no joint swelling or tenderness  Neurological: AxOx3, moving all extremities, no focal deficits  Skin: no rashes      Labs:    12-09    136  |  104  |  8   ----------------------------<  80  4.5   |  28  |  0.51    Ca    8.5      09 Dec 2017 06:40  Mg     1.9     12-08             Cultures:       Culture - Blood (collected 12-06-17 @ 23:40)  Source: .Blood None  Preliminary Report (12-08-17 @ 09:02):    No growth to date.    Culture - Blood (collected 12-06-17 @ 23:40)  Source: .Blood None  Preliminary Report (12-08-17 @ 09:02):    No growth to date.                              12.7   6.2   )-----------( 189      ( 07 Dec 2017 05:13 )             36.6     12-07    136  |  103  |  14  ----------------------------<  111<H>  3.2<L>   |  26  |  0.56    Ca    7.7<L>      07 Dec 2017 05:13  Mg     2.0     12-06    TPro  7.4  /  Alb  3.5  /  TBili  0.4  /  DBili  x   /  AST  22  /  ALT  19  /  AlkPhos  76  12-06     LIVER FUNCTIONS - ( 06 Dec 2017 23:40 )  Alb: 3.5 g/dL / Pro: 7.4 gm/dL / ALK PHOS: 76 U/L / ALT: 19 U/L / AST: 22 U/L / GGT: x           Rapid Respiratory Viral Panel (12.06.17 @ 23:40)    Rapid RVP Result: Detected: The [employer] RVP Rapid uses polymerase chain reaction (PCR) and melt  curve analysis to screen for adenovirus; coronavirus HKU1, NL63, 229E,  OC43; human metapneumovirus (hMPV); human enterovirus/rhinovirus  (Entero/RV); influenza A; influenza A/H1;influenza A/H3; influenza  A/H1-2009; influenza B; parainfluenza viruses 1, 2, 3, 4; respiratory  syncytial virus; Bordetella pertussis; Mycoplasma pneumoniae; and  Chlamydophila pneumoniae.    Resp Syncytial Virus (RapRVP): Detected: Test Name:rsv  Called by:Vasiliy  Called to:Morelia RN  Read back 2 Pt IDs:y Read back values:y Date/Tm:12/07/17 02:32          Radiology:< from: Xray Chest 1 View AP/PA. (12.07.17 @ 09:25) >    EXAM:  CHEST SINGLE VIEW FRONTAL                          EXAM:  CHEST SINGLE VIEW FRONTAL                            PROCEDURE DATE:  12/06/2017          INTERPRETATION:  Clinical information: Shortness of breath    AP view of the chest from December 06, 2017, 2354 hours    COMPARISON: March 28, 2017    FINDINGS: The cardiac, hilar and mediastinal contours are unremarkable.   The lungs are clear. There is no pleural effusion or pneumothorax. There   is mild thoracic scoliosis, convex right.    December 07, 2017, 0859 hours:    There is no change.    IMPRESSION:    Clear lungs    No change since March 28, 2017    < end of copied text >      Advanced directive addressed: full resuscitation

## 2017-12-09 NOTE — PROGRESS NOTE ADULT - SUBJECTIVE AND OBJECTIVE BOX
cc: sob  hpi: 89y female presents from home w/ cough and fever.  Pt found to be + RSV and was admitted early this morning.    chart, labs, imaging reviewed.  Called by nurses early b/c pt coughing taking potassium supplement.  Repeat cxr unchanged.  O2 sats remain stable on room air.   Pt c/o cough, nonproductive.  Denies cp, sob.  No sick contacts.  Lives w/ daughter.  Ambulates w/ cane at baseline.   Upset b/c she's hungry.     12/8- low grade temps overnight - feeling weak and tired.  No appetite.  Upset she is here.  12/9- complains of feeling weak.  Cough improving.  No sob or chest pain.  Ate breakfast.  BP elevated since 10pm last night.   Improved this am after norvasc.      Vital Signs Last 24 Hrs  T(C): 36.6 (09 Dec 2017 10:26), Max: 36.9 (08 Dec 2017 17:39)  T(F): 97.8 (09 Dec 2017 10:26), Max: 98.4 (08 Dec 2017 17:39)  HR: 103 (09 Dec 2017 10:26) (86 - 103)  BP: 149/94 (09 Dec 2017 10:26) (149/94 - 195/83)  BP(mean): --  RR: 16 (09 Dec 2017 10:26) (16 - 18)  SpO2: 98% (09 Dec 2017 10:26) (94% - 99%)      PHYSICAL EXAM:  General: elderly female,  NAD, comfortably seated in bed, good O2 sats on room air   Neuro: AAOx3  HEENT: NCAT  Neck: Soft and supple  Respiratory: coarse b/l lung sounds improving, no wheezing   Cardiovascular: S1 and S2, RRR  Gastrointestinal: +BS, soft  Extremities/MSK: full rom, no edema  Vascular: 2+ peripheral pulses  Skin: No rashes        LABS: All Labs Reviewed:    12-09    136  |  104  |  8   ----------------------------<  80  4.5   |  28  |  0.51    Ca    8.5      09 Dec 2017 06:40  Mg     1.9     12-08        Culture - Blood (12.06.17 @ 23:40)    Specimen Source: .Blood None    Culture Results:   No growth to date.                              12.7   6.2   )-----------( 189      ( 07 Dec 2017 05:13 )             36.6     12-08    134<L>  |  101  |  6<L>  ----------------------------<  92  3.2<L>   |  26  |  0.44<L>    Ca    7.8<L>      08 Dec 2017 05:46  Mg     1.9     12-08    TPro  7.4  /  Alb  3.5  /  TBili  0.4  /  DBili  x   /  AST  22  /  ALT  19  /  AlkPhos  76  12-06    PT/INR - ( 06 Dec 2017 23:40 )   PT: 13.9 sec;   INR: 1.28 ratio         PTT - ( 06 Dec 2017 23:40 )  PTT:29.1 sec        Culture - Blood (12.06.17 @ 23:40)    Specimen Source: .Blood None    Culture Results:   No growth to date.                             12.7   6.2   )-----------( 189      ( 07 Dec 2017 05:13 )             36.6     12-07    136  |  103  |  14  ----------------------------<  111<H>  3.2<L>   |  26  |  0.56    Ca    7.7<L>      07 Dec 2017 05:13  Mg     2.0     12-06    TPro  7.4  /  Alb  3.5  /  TBili  0.4  /  DBili  x   /  AST  22  /  ALT  19  /  AlkPhos  76  12-06    PT/INR - ( 06 Dec 2017 23:40 )   PT: 13.9 sec;   INR: 1.28 ratio         PTT - ( 06 Dec 2017 23:40 )  PTT:29.1 sec      < from: Xray Chest 1 View AP/PA. (12.07.17 @ 09:25) >  IMPRESSION:    Clear lungs    No change since March 28, 2017    MEDICATIONS  (STANDING):  ALBUTerol    90 MICROgram(s) HFA Inhaler 1 Puff(s) Inhalation every 4 hours  amLODIPine   Tablet 5 milliGRAM(s) Oral daily  famotidine    Tablet 20 milliGRAM(s) Oral daily    MEDICATIONS  (PRN):  acetaminophen   Tablet 650 milliGRAM(s) Oral every 6 hours PRN For Temp greater than 38 C (100.4 F), mild pain, HA  ALBUTerol    0.083% 2.5 milliGRAM(s) Nebulizer every 6 hours PRN Shortness of Breath and/or Wheezing  ondansetron Injectable 4 milliGRAM(s) IV Push every 6 hours PRN Nausea        Assessment and Plan:   89y female w/     *cough, dyspnea due to RSV URI  *sepsis due to RSV URI  - no fevers documented here and no leukocytosis but pt had elevated HR and RR  and elevated lactate (no acidosis) now normal  - pt did receive abx for possible pna but imaging unremarkable - d/c antibiotics  - continue IVF  -good O2 sats on room air --> transfer to med surg-  no need for continuous O2 monitoring   - ID f/u appreciated     *hypokalemia  - repleted  w/ IV and liquid (cannot tolerate the large po pill)    *hyponatremia  - resolved w/ IVF     *htn  - increase home dose of norvasc  - if bp elevated overnight give hydralazine prn    *dvt px   - lovenox sc  - ambulate     PT eval appreciated - rec [facility] vs home w/ PT.   12/9- clinically improving.  D/c planning Monday.

## 2017-12-09 NOTE — PROGRESS NOTE ADULT - ASSESSMENT
89 y.o. female with PMH hiatal hernia, acid reflux admitted 12/6 for evaluation of 2 day history cough productive of yellow sputum, malaise, and overall weakness. Patient denies sick contact, states she had a flu shot; was asking to be discharged home.  1. Patient admitted with RSV upper respiratory tract infection  - continue respiratory droplet isolation  - iv hydration and supportive care   - serial cbc and monitor temperature  - oxygen and nebs as needed  - no need for antibiotics as no superimposed bacterial pnuemonia  - If further ID issues please reconsult

## 2017-12-10 RX ORDER — HYDRALAZINE HCL 50 MG
10 TABLET ORAL ONCE
Qty: 0 | Refills: 0 | Status: COMPLETED | OUTPATIENT
Start: 2017-12-10 | End: 2017-12-10

## 2017-12-10 RX ADMIN — Medication 200 MILLIGRAM(S): at 05:51

## 2017-12-10 RX ADMIN — Medication 200 MILLIGRAM(S): at 12:15

## 2017-12-10 RX ADMIN — Medication 600 MILLIGRAM(S): at 05:47

## 2017-12-10 RX ADMIN — Medication 600 MILLIGRAM(S): at 17:39

## 2017-12-10 RX ADMIN — AMLODIPINE BESYLATE 5 MILLIGRAM(S): 2.5 TABLET ORAL at 05:47

## 2017-12-10 RX ADMIN — Medication 10 MILLIGRAM(S): at 02:30

## 2017-12-10 RX ADMIN — FAMOTIDINE 20 MILLIGRAM(S): 10 INJECTION INTRAVENOUS at 12:15

## 2017-12-10 NOTE — PROGRESS NOTE ADULT - SUBJECTIVE AND OBJECTIVE BOX
cc: sob  hpi: 89y female presents from home w/ cough and fever.  Pt found to be + RSV and was admitted early this morning.    chart, labs, imaging reviewed.  Called by nurses early b/c pt coughing taking potassium supplement.  Repeat cxr unchanged.  O2 sats remain stable on room air.   Pt c/o cough, nonproductive.  Denies cp, sob.  No sick contacts.  Lives w/ daughter.  Ambulates w/ cane at baseline.   Upset b/c she's hungry.     12/8- low grade temps overnight - feeling weak and tired.  No appetite.  Upset she is here.  12/9- complains of feeling weak.  Cough improving.  No sob or chest pain.  Ate breakfast.  BP elevated since 10pm last night.   Improved this am after norvasc.    12/10- c/o cough w/ minimal sputum.     ros- no cp, sob, palp, n/v/d    Vital Signs Last 24 Hrs  T(C): 37.2 (10 Dec 2017 04:24), Max: 37.3 (10 Dec 2017 02:10)  T(F): 98.9 (10 Dec 2017 04:24), Max: 99.1 (10 Dec 2017 02:10)  HR: 91 (10 Dec 2017 04:24) (85 - 101)  BP: 140/55 (10 Dec 2017 04:24) (140/55 - 170/73)  BP(mean): --  RR: 18 (10 Dec 2017 04:24) (17 - 18)  SpO2: 95% (10 Dec 2017 04:24) (91% - 98%)      PHYSICAL EXAM:  General: elderly female,  NAD, comfortably seated in bed, good O2 sats on room air   Neuro: AAOx3, no focal deficits  HEENT: NCAT, MMM  Neck: Soft and supple  Respiratory: coarse b/l lung sounds improving, no wheezing , coughing  Cardiovascular: S1 and S2, RRR  Gastrointestinal: +BS, soft  Extremities/MSK: full rom, no edema  Vascular: 2+ peripheral pulses  Skin: No rashes        LABS: All Labs Reviewed:    12-09    136  |  104  |  8   ----------------------------<  80  4.5   |  28  |  0.51    Ca    8.5      09 Dec 2017 06:40  Mg     1.9     12-08        Culture - Blood (12.06.17 @ 23:40)    Specimen Source: .Blood None    Culture Results:   No growth to date.     from: Xray Chest 1 View AP/PA. (12.07.17 @ 09:25) >  IMPRESSION:    Clear lungs    No change since March 28, 2017    MEDICATIONS  (STANDING):  ALBUTerol    90 MICROgram(s) HFA Inhaler 1 Puff(s) Inhalation every 4 hours  amLODIPine   Tablet 5 milliGRAM(s) Oral daily  famotidine    Tablet 20 milliGRAM(s) Oral daily  guaiFENesin  milliGRAM(s) Oral every 12 hours    MEDICATIONS  (PRN):  acetaminophen   Tablet 650 milliGRAM(s) Oral every 6 hours PRN For Temp greater than 38 C (100.4 F), mild pain, HA  ALBUTerol    0.083% 2.5 milliGRAM(s) Nebulizer every 6 hours PRN Shortness of Breath and/or Wheezing  guaiFENesin    Syrup 200 milliGRAM(s) Oral every 6 hours PRN Cough  ondansetron Injectable 4 milliGRAM(s) IV Push every 6 hours PRN Nausea        Assessment and Plan:   89y female w/     *cough, dyspnea, sepsis due to RSV URI- resolving   - pt did receive abx x 1d for possible pna but imaging unremarkable - d/c antibiotics  -good O2 sats on room air --> transfer to med surg-  no need for continuous O2 monitoring   - ID f/u appreciated     *hypokalemia  - repleted  w/ IV and liquid (cannot tolerate the large po pill)    *hyponatremia  - resolved w/ IVF     *htn  - improving w/ increased home dose norvasc    *dvt px   - lovenox sc  - ambulate     PT eval appreciated - rec [facility]YLIN vs home w/ PT.   12/10- clinically improving.  D/c planning Monday.   Needs AYLIN vs home w/ PT (lives alone).

## 2017-12-11 RX ADMIN — FAMOTIDINE 20 MILLIGRAM(S): 10 INJECTION INTRAVENOUS at 11:47

## 2017-12-11 RX ADMIN — Medication 600 MILLIGRAM(S): at 18:08

## 2017-12-11 RX ADMIN — AMLODIPINE BESYLATE 5 MILLIGRAM(S): 2.5 TABLET ORAL at 06:15

## 2017-12-11 RX ADMIN — Medication 600 MILLIGRAM(S): at 06:15

## 2017-12-11 NOTE — PROGRESS NOTE ADULT - ASSESSMENT
*cough, dyspnea, sepsis due to RSV URI- resolving   - pt did receive abx x 1d for possible pna but imaging unremarkable - d/c antibiotics  -good O2 sats on room air --> transfer to med surg-  no need for continuous O2 monitoring   - ID f/u appreciated     *hypokalemia  - repleted  w/ IV and liquid (cannot tolerate the large po pill)    *hyponatremia  - resolved w/ IVF     *htn  - improving w/ increased home dose norvasc    *dvt px   - lovenox sc  - ambulate     PT eval appreciated - rec [facility]LIN vs home w/ PT.   12/11- D/c planning.  d/w Case Management.   will d/w Shana [phone].  Needs AYLIN vs home w/ PT (lives alone).

## 2017-12-11 NOTE — PROGRESS NOTE ADULT - SUBJECTIVE AND OBJECTIVE BOX
CC:  Patient is a 89y old  Female who presents with a chief complaint of shortness of breath (07 Dec 2017 03:34)    SUBJECTIVE:      ROS:      acetaminophen   Tablet 650 milliGRAM(s) Oral every 6 hours PRN  ALBUTerol    0.083% 2.5 milliGRAM(s) Nebulizer every 6 hours PRN  ALBUTerol    90 MICROgram(s) HFA Inhaler 1 Puff(s) Inhalation every 4 hours  amLODIPine   Tablet 5 milliGRAM(s) Oral daily  famotidine    Tablet 20 milliGRAM(s) Oral daily  guaiFENesin    Syrup 200 milliGRAM(s) Oral every 6 hours PRN  guaiFENesin  milliGRAM(s) Oral every 12 hours  ondansetron Injectable 4 milliGRAM(s) IV Push every 6 hours PRN    T(C): 37.3 (12-11-17 @ 18:01), Max: 37.3 (12-10-17 @ 23:15)  HR: 99 (12-11-17 @ 18:01) (79 - 100)  BP: 144/62 (12-11-17 @ 18:01) (127/61 - 144/62)  RR: 17 (12-11-17 @ 18:01) (17 - 18)  SpO2: 93% (12-11-17 @ 18:01) (93% - 97%)    PHYSICAL EXAM:  General: elderly female,  NAD, comfortably seated in bed, good O2 sats on room air   Neuro: AAOx3, no focal deficits  HEENT: NCAT, MMM  Neck: Soft and supple  Respiratory: coarse b/l lung sounds improving, no wheezing , coughing  Cardiovascular: S1 and S2, RRR  Gastrointestinal: +BS, soft  Extremities/MSK: full rom, no edema  Vascular: 2+ peripheral pulses  Skin: No rashes

## 2017-12-12 ENCOUNTER — INPATIENT (INPATIENT)
Facility: HOSPITAL | Age: 82
LOS: 2 days | Discharge: NURSING, REHAB OR LONG-TERM CARE | DRG: 866 | End: 2017-12-15
Attending: HOSPITALIST | Admitting: FAMILY MEDICINE
Payer: MEDICARE

## 2017-12-12 ENCOUNTER — TRANSCRIPTION ENCOUNTER (OUTPATIENT)
Age: 82
End: 2017-12-12

## 2017-12-12 VITALS
RESPIRATION RATE: 16 BRPM | OXYGEN SATURATION: 94 % | TEMPERATURE: 99 F | SYSTOLIC BLOOD PRESSURE: 148 MMHG | HEART RATE: 96 BPM | DIASTOLIC BLOOD PRESSURE: 68 MMHG

## 2017-12-12 VITALS
TEMPERATURE: 98 F | HEIGHT: 61 IN | HEART RATE: 106 BPM | DIASTOLIC BLOOD PRESSURE: 89 MMHG | RESPIRATION RATE: 28 BRPM | SYSTOLIC BLOOD PRESSURE: 144 MMHG | WEIGHT: 125 LBS

## 2017-12-12 DIAGNOSIS — Z90.49 ACQUIRED ABSENCE OF OTHER SPECIFIED PARTS OF DIGESTIVE TRACT: Chronic | ICD-10-CM

## 2017-12-12 DIAGNOSIS — Z90.721 ACQUIRED ABSENCE OF OVARIES, UNILATERAL: Chronic | ICD-10-CM

## 2017-12-12 DIAGNOSIS — Z90.89 ACQUIRED ABSENCE OF OTHER ORGANS: Chronic | ICD-10-CM

## 2017-12-12 LAB
BASOPHILS # BLD AUTO: 0 K/UL — SIGNIFICANT CHANGE UP (ref 0–0.2)
BASOPHILS NFR BLD AUTO: 0.4 % — SIGNIFICANT CHANGE UP (ref 0–2)
CULTURE RESULTS: SIGNIFICANT CHANGE UP
CULTURE RESULTS: SIGNIFICANT CHANGE UP
D DIMER BLD IA.RAPID-MCNC: 451 NG/ML DDU — HIGH
EOSINOPHIL # BLD AUTO: 0 K/UL — SIGNIFICANT CHANGE UP (ref 0–0.5)
EOSINOPHIL NFR BLD AUTO: 0.6 % — SIGNIFICANT CHANGE UP (ref 0–6)
HCT VFR BLD CALC: 43.6 % — SIGNIFICANT CHANGE UP (ref 37–47)
HGB BLD-MCNC: 14.9 G/DL — SIGNIFICANT CHANGE UP (ref 12–16)
LACTATE BLDV-MCNC: 1.2 MMOL/L — SIGNIFICANT CHANGE UP (ref 0.5–2)
LYMPHOCYTES # BLD AUTO: 1.4 K/UL — SIGNIFICANT CHANGE UP (ref 1–4.8)
LYMPHOCYTES # BLD AUTO: 16.5 % — LOW (ref 20–55)
MCHC RBC-ENTMCNC: 32.5 PG — HIGH (ref 27–31)
MCHC RBC-ENTMCNC: 34.2 G/DL — SIGNIFICANT CHANGE UP (ref 32–36)
MCV RBC AUTO: 95.2 FL — SIGNIFICANT CHANGE UP (ref 81–99)
MONOCYTES # BLD AUTO: 0.8 K/UL — SIGNIFICANT CHANGE UP (ref 0–0.8)
MONOCYTES NFR BLD AUTO: 9.6 % — SIGNIFICANT CHANGE UP (ref 3–10)
NEUTROPHILS # BLD AUTO: 6.1 K/UL — SIGNIFICANT CHANGE UP (ref 1.8–8)
NEUTROPHILS NFR BLD AUTO: 72.7 % — SIGNIFICANT CHANGE UP (ref 37–73)
PLATELET # BLD AUTO: 298 K/UL — SIGNIFICANT CHANGE UP (ref 150–400)
RBC # BLD: 4.58 M/UL — SIGNIFICANT CHANGE UP (ref 4.4–5.2)
RBC # FLD: 13 % — SIGNIFICANT CHANGE UP (ref 11–15.6)
SPECIMEN SOURCE: SIGNIFICANT CHANGE UP
SPECIMEN SOURCE: SIGNIFICANT CHANGE UP
WBC # BLD: 8.3 K/UL — SIGNIFICANT CHANGE UP (ref 4.8–10.8)
WBC # FLD AUTO: 8.3 K/UL — SIGNIFICANT CHANGE UP (ref 4.8–10.8)

## 2017-12-12 RX ORDER — SODIUM CHLORIDE 9 MG/ML
1000 INJECTION INTRAMUSCULAR; INTRAVENOUS; SUBCUTANEOUS ONCE
Qty: 0 | Refills: 0 | Status: COMPLETED | OUTPATIENT
Start: 2017-12-12 | End: 2017-12-12

## 2017-12-12 RX ORDER — AMLODIPINE BESYLATE 2.5 MG/1
1 TABLET ORAL
Qty: 14 | Refills: 0 | OUTPATIENT
Start: 2017-12-12 | End: 2017-12-25

## 2017-12-12 RX ORDER — ALBUTEROL 90 UG/1
2 AEROSOL, METERED ORAL
Qty: 1 | Refills: 0 | OUTPATIENT
Start: 2017-12-12 | End: 2017-12-18

## 2017-12-12 RX ADMIN — SODIUM CHLORIDE 1000 MILLILITER(S): 9 INJECTION INTRAMUSCULAR; INTRAVENOUS; SUBCUTANEOUS at 23:40

## 2017-12-12 RX ADMIN — Medication 600 MILLIGRAM(S): at 06:06

## 2017-12-12 RX ADMIN — AMLODIPINE BESYLATE 5 MILLIGRAM(S): 2.5 TABLET ORAL at 06:06

## 2017-12-12 NOTE — DIETITIAN INITIAL EVALUATION ADULT. - OTHER INFO
89yo female from community p/w cough and dyspnea 2/2 sepsis 2/2 RSV URI (resolving).  Pt had SLP eval on 12/7; rec'd regular texture with thin liquids.  Upon visit, pt appears thin with severe muscle wasting at temporal, clavicl, and thighs.  Pt with moderate fat wasting at eyes and ribs.  Pt reports significant wt loss x 5 months of 15% (22#) 2/2 decreased appetite.  However, pt reports limited solid food intake; very small portions x 3 times a day, likely meeting <50% of estimated nutr needs.  Pt also reports drinking 4 ensure per day, providing ~1400Kcal and ~80g protein, meeting 100% of caloric and protein needs (strawberry flavor).  based on physical exam and reported wt loss, pt meets criteria for severe malnutrition in context of chronic illness.  Rec'd add ensure four times a day (strawberry flavor).  d/w pt importance of adquate PO intake.  Monitor PO intake/wt closely.  nutr labs currently WNL.

## 2017-12-12 NOTE — DISCHARGE NOTE ADULT - [id number]_GEN_ALL_CORE
teaching and training/rehabilitation services/observation and assessment/medication teaching and assessment

## 2017-12-12 NOTE — DISCHARGE NOTE ADULT - CARE PLAN
Principal Discharge DX:	RSV (respiratory syncytial virus infection)  Goal:	see below.  Instructions for follow-up, activity and diet:	Proventil HFA and guaifenesin as needed for cough.

## 2017-12-12 NOTE — DISCHARGE NOTE ADULT - HOSPITAL COURSE
CC:  Patient is a 89y old  Female who presents with a chief complaint of shortness of breath (07 Dec 2017 03:34)    SUBJECTIVE:  feeling well.  offers no complaints.    ROS:  (+) cough.    acetaminophen   Tablet 650 milliGRAM(s) Oral every 6 hours PRN  ALBUTerol    0.083% 2.5 milliGRAM(s) Nebulizer every 6 hours PRN  ALBUTerol    90 MICROgram(s) HFA Inhaler 1 Puff(s) Inhalation every 4 hours  amLODIPine   Tablet 5 milliGRAM(s) Oral daily  famotidine    Tablet 20 milliGRAM(s) Oral daily  guaiFENesin    Syrup 200 milliGRAM(s) Oral every 6 hours PRN  guaiFENesin  milliGRAM(s) Oral every 12 hours  ondansetron Injectable 4 milliGRAM(s) IV Push every 6 hours PRN    T(C): 37 (12-12-17 @ 10:39), Max: 37.3 (12-11-17 @ 18:01)  HR: 96 (12-12-17 @ 10:39) (96 - 100)  BP: 148/68 (12-12-17 @ 10:39) (141/58 - 151/63)  RR: 16 (12-12-17 @ 10:39) (16 - 18)  SpO2: 94% (12-12-17 @ 10:39) (93% - 95%)    PHYSICAL EXAM:  General: elderly female,  NAD, comfortably seated in bed, good O2 sats on room air   Neuro: AAOx3, no focal deficits  HEENT: NCAT, MMM  Neck: Soft and supple  Respiratory: coarse b/l lung sounds improving, no wheezing , coughing  Cardiovascular: S1 and S2, RRR  Gastrointestinal: +BS, soft  Extremities/MSK: full rom, no edema  Vascular: 2+ peripheral pulsesGeneral: elderly female,  NAD, comfortably seated in bed, good O2 sats on room air   Neuro: AAOx3, no focal deficits  HEENT: NCAT, MMM  Neck: Soft and supple  Respiratory: coarse b/l lung sounds improving, no wheezing , coughing  Cardiovascular: S1 and S2, RRR  Gastrointestinal: +BS, soft  Extremities/MSK: full rom, no edema  Vascular: 2+ peripheral pulses  Skin: No rashes    RVP.  -demonstrable clinical improvement.  -c/w MANJIT PRN.  Rx'd Proventil HFA.  -c/w guaifenesin PRN.    HTN.  -c/w Norvasc.    disposition.  -patient deferred [facility].  -d/c home w/ home care services.  -advised to establish w/ community PMD, [facility].  -d/c > 35 minutes.    communication.  -d/w RN.  -d/w Case Management.       Skin: No rashes

## 2017-12-12 NOTE — ED PROVIDER NOTE - OBJECTIVE STATEMENT
89 yo female pmh hiatal hernia come 89 yo female pmh hiatal hernia comes  to ed  with cough with shortness of breath; denies fever, chills ; pt with recent admission to [facility] with positive rsv; ;

## 2017-12-12 NOTE — DISCHARGE NOTE ADULT - CARE PROVIDER_API CALL
[facility],   [address] [place] [address]  Phone: [phone]  Fax: (   )    -

## 2017-12-12 NOTE — DISCHARGE NOTE ADULT - COMMUNITY RESOURCES
Transport home by daughter; [facility] appointment on Tuesday, 12/15/2017 at 1:20pm with Dr. Tammy Taylor Transport home by daughter; [facility] appointment on Friday, 12/15/2017 at 1:20pm with Dr. Tammy Taylor( must be there by 1pm)

## 2017-12-12 NOTE — DISCHARGE NOTE ADULT - MEDICATION SUMMARY - MEDICATIONS TO TAKE
I will START or STAY ON the medications listed below when I get home from the hospital:    Proventil HFA 90 mcg/inh inhalation aerosol  -- 2 puff(s) inhaled 4 times a day   -- For inhalation only.  It is very important that you take or use this exactly as directed.  Do not skip doses or discontinue unless directed by your doctor.  Obtain medical advice before taking any non-prescription drugs as some may affect the action of this medication.  Shake well before use.    -- Indication: For RESPIRATORY SYNCYTIAL VIRUS INFECTION    amLODIPine 5 mg oral tablet  -- 1 tab(s) by mouth once a day  -- Indication: For Hypertension    guaiFENesin 100 mg/5 mL oral liquid  -- 10 milliliter(s) by mouth every 6 hours, As needed, Cough  -- Indication: For cough    Zantac 15 mg/mL oral syrup  -- 75 mg by mouth 2 times a day  -- Indication: For GERD

## 2017-12-12 NOTE — ED ADULT NURSE NOTE - OBJECTIVE STATEMENT
Patient A&Ox4, denies any pain or discomfort. Respirations even & unlabored, denies any numbness or tingling. Lung sounds rales/ronchi bilate, positive cough, stated has been coughing up "a little amount" of yellow sputum. Cardiac monitor in place, NSR. Abdomen soft, tender upon palpation to LLQ.

## 2017-12-12 NOTE — DISCHARGE NOTE ADULT - PROVIDER TOKENS
FREE:[LAST:[[facility]],PHONE:[phone]],FAX:[(   )    -],ADDRESS:[[facility] [place] [address]]]

## 2017-12-12 NOTE — DISCHARGE NOTE ADULT - PATIENT PORTAL LINK FT
“You can access the FollowHealth Patient Portal, offered by [facility], by registering with the following website: [email or web address]”

## 2017-12-12 NOTE — CHART NOTE - NSCHARTNOTEFT_GEN_A_CORE
Upon Nutritional Assessment by the Registered Dietitian your patient was determined to meet criteria / has evidence of the following diagnosis/diagnoses:          [ ]  Mild Protein Calorie Malnutrition        [ ]  Moderate Protein Calorie Malnutrition        [x] Severe Protein Calorie Malnutrition        [ ] Unspecified Protein Calorie Malnutrition        [ ] Underweight / BMI <19        [ ] Morbid Obesity / BMI > 40      Findings as based on:  •  Comprehensive nutrition assessment and consultation  •  Calorie counts (nutrient intake analysis)  •  Food acceptance and intake status from observations by staff  •  Follow up  •  Patient education  •  Intervention secondary to interdisciplinary rounds  •   concerns  *severe muscle loss at temporal, clavicl, and thigh.  *mod fat loss at eye and ribs  *significant wt loss x 5 months (15%, 22#)      Treatment:    The following diet has been recommended:  1: add ensure enlive four times a day (strawberry)  2: add MVI daily  3: daily wt checks  4: encourage increased PO intake from tray      PROVIDER Section:     By signing this assessment you are acknowledging and agree with the diagnosis/diagnoses assigned by the Registered Dietitian    Comments:

## 2017-12-12 NOTE — DIETITIAN INITIAL EVALUATION ADULT. - ENERGY NEEDS
Ht.  64  "        Wt.  54.4   kg      20.5.  BMI                  IBW   54.5 kg               Pt is at   99.7 %  IBW

## 2017-12-12 NOTE — DISCHARGE NOTE ADULT - MEDICATION SUMMARY - MEDICATIONS TO STOP TAKING
I will STOP taking the medications listed below when I get home from the hospital:    Zantac 15 mg/mL oral syrup  -- 10 milliliter(s) by mouth 2 times a day

## 2017-12-13 DIAGNOSIS — K44.9 DIAPHRAGMATIC HERNIA WITHOUT OBSTRUCTION OR GANGRENE: ICD-10-CM

## 2017-12-13 DIAGNOSIS — B34.9 VIRAL INFECTION, UNSPECIFIED: ICD-10-CM

## 2017-12-13 DIAGNOSIS — Z74.2 NEED FOR ASSISTANCE AT HOME AND NO OTHER HOUSEHOLD MEMBER ABLE TO RENDER CARE: ICD-10-CM

## 2017-12-13 DIAGNOSIS — I10 ESSENTIAL (PRIMARY) HYPERTENSION: ICD-10-CM

## 2017-12-13 DIAGNOSIS — R06.02 SHORTNESS OF BREATH: ICD-10-CM

## 2017-12-13 DIAGNOSIS — Z29.9 ENCOUNTER FOR PROPHYLACTIC MEASURES, UNSPECIFIED: ICD-10-CM

## 2017-12-13 DIAGNOSIS — R53.1 WEAKNESS: ICD-10-CM

## 2017-12-13 PROBLEM — E78.5 HYPERLIPIDEMIA, UNSPECIFIED: Chronic | Status: INACTIVE | Noted: 2017-03-26 | Resolved: 2017-12-13

## 2017-12-13 LAB
ALBUMIN SERPL ELPH-MCNC: 3.9 G/DL — SIGNIFICANT CHANGE UP (ref 3.3–5.2)
ALP SERPL-CCNC: 72 U/L — SIGNIFICANT CHANGE UP (ref 40–120)
ALT FLD-CCNC: 13 U/L — SIGNIFICANT CHANGE UP
ANION GAP SERPL CALC-SCNC: 13 MMOL/L — SIGNIFICANT CHANGE UP (ref 5–17)
APPEARANCE UR: CLEAR — SIGNIFICANT CHANGE UP
AST SERPL-CCNC: 20 U/L — SIGNIFICANT CHANGE UP
BACTERIA # UR AUTO: ABNORMAL
BILIRUB SERPL-MCNC: 0.4 MG/DL — SIGNIFICANT CHANGE UP (ref 0.4–2)
BILIRUB UR-MCNC: NEGATIVE — SIGNIFICANT CHANGE UP
BUN SERPL-MCNC: 17 MG/DL — SIGNIFICANT CHANGE UP (ref 8–20)
CALCIUM SERPL-MCNC: 9 MG/DL — SIGNIFICANT CHANGE UP (ref 8.6–10.2)
CHLORIDE SERPL-SCNC: 93 MMOL/L — LOW (ref 98–107)
CO2 SERPL-SCNC: 29 MMOL/L — SIGNIFICANT CHANGE UP (ref 22–29)
COLOR SPEC: YELLOW — SIGNIFICANT CHANGE UP
CREAT SERPL-MCNC: 0.52 MG/DL — SIGNIFICANT CHANGE UP (ref 0.5–1.3)
DIFF PNL FLD: ABNORMAL
EPI CELLS # UR: SIGNIFICANT CHANGE UP
GLUCOSE SERPL-MCNC: 111 MG/DL — SIGNIFICANT CHANGE UP (ref 70–115)
GLUCOSE UR QL: NEGATIVE MG/DL — SIGNIFICANT CHANGE UP
KETONES UR-MCNC: ABNORMAL
LEUKOCYTE ESTERASE UR-ACNC: ABNORMAL
NITRITE UR-MCNC: NEGATIVE — SIGNIFICANT CHANGE UP
NT-PROBNP SERPL-SCNC: 210 PG/ML — SIGNIFICANT CHANGE UP (ref 0–300)
PH UR: 7 — SIGNIFICANT CHANGE UP (ref 5–8)
POTASSIUM SERPL-MCNC: 4.2 MMOL/L — SIGNIFICANT CHANGE UP (ref 3.5–5.3)
POTASSIUM SERPL-SCNC: 4.2 MMOL/L — SIGNIFICANT CHANGE UP (ref 3.5–5.3)
PROT SERPL-MCNC: 7.5 G/DL — SIGNIFICANT CHANGE UP (ref 6.6–8.7)
PROT UR-MCNC: 30 MG/DL
RBC CASTS # UR COMP ASSIST: SIGNIFICANT CHANGE UP /HPF (ref 0–4)
SODIUM SERPL-SCNC: 135 MMOL/L — SIGNIFICANT CHANGE UP (ref 135–145)
SP GR SPEC: 1.01 — SIGNIFICANT CHANGE UP (ref 1.01–1.02)
TROPONIN T SERPL-MCNC: <0.01 NG/ML — SIGNIFICANT CHANGE UP (ref 0–0.06)
UROBILINOGEN FLD QL: NEGATIVE MG/DL — SIGNIFICANT CHANGE UP
WBC UR QL: SIGNIFICANT CHANGE UP

## 2017-12-13 PROCEDURE — 99285 EMERGENCY DEPT VISIT HI MDM: CPT

## 2017-12-13 PROCEDURE — 71010: CPT | Mod: 26

## 2017-12-13 PROCEDURE — 71275 CT ANGIOGRAPHY CHEST: CPT | Mod: 26

## 2017-12-13 PROCEDURE — 74177 CT ABD & PELVIS W/CONTRAST: CPT | Mod: 26

## 2017-12-13 PROCEDURE — 99223 1ST HOSP IP/OBS HIGH 75: CPT | Mod: GC

## 2017-12-13 RX ORDER — ENOXAPARIN SODIUM 100 MG/ML
40 INJECTION SUBCUTANEOUS DAILY
Qty: 0 | Refills: 0 | Status: DISCONTINUED | OUTPATIENT
Start: 2017-12-13 | End: 2017-12-15

## 2017-12-13 RX ORDER — FAMOTIDINE 10 MG/ML
20 INJECTION INTRAVENOUS
Qty: 0 | Refills: 0 | Status: DISCONTINUED | OUTPATIENT
Start: 2017-12-13 | End: 2017-12-15

## 2017-12-13 RX ORDER — ACETAMINOPHEN 500 MG
650 TABLET ORAL EVERY 6 HOURS
Qty: 0 | Refills: 0 | Status: DISCONTINUED | OUTPATIENT
Start: 2017-12-13 | End: 2017-12-15

## 2017-12-13 RX ORDER — ALBUTEROL 90 UG/1
2.5 AEROSOL, METERED ORAL EVERY 4 HOURS
Qty: 0 | Refills: 0 | Status: DISCONTINUED | OUTPATIENT
Start: 2017-12-13 | End: 2017-12-15

## 2017-12-13 RX ORDER — AMLODIPINE BESYLATE 2.5 MG/1
5 TABLET ORAL DAILY
Qty: 0 | Refills: 0 | Status: DISCONTINUED | OUTPATIENT
Start: 2017-12-13 | End: 2017-12-14

## 2017-12-13 RX ORDER — ALBUTEROL 90 UG/1
1 AEROSOL, METERED ORAL EVERY 4 HOURS
Qty: 0 | Refills: 0 | Status: DISCONTINUED | OUTPATIENT
Start: 2017-12-13 | End: 2017-12-13

## 2017-12-13 RX ORDER — ONDANSETRON 8 MG/1
4 TABLET, FILM COATED ORAL EVERY 6 HOURS
Qty: 0 | Refills: 0 | Status: DISCONTINUED | OUTPATIENT
Start: 2017-12-13 | End: 2017-12-15

## 2017-12-13 RX ADMIN — AMLODIPINE BESYLATE 5 MILLIGRAM(S): 2.5 TABLET ORAL at 05:54

## 2017-12-13 RX ADMIN — ENOXAPARIN SODIUM 40 MILLIGRAM(S): 100 INJECTION SUBCUTANEOUS at 11:43

## 2017-12-13 RX ADMIN — FAMOTIDINE 20 MILLIGRAM(S): 10 INJECTION INTRAVENOUS at 08:52

## 2017-12-13 RX ADMIN — Medication 200 MILLIGRAM(S): at 08:52

## 2017-12-13 NOTE — H&P ADULT - PROBLEM SELECTOR PLAN 5
- patient with chronic acid reflux 2/2 hiatal hernia  - start patient on famotidine (therapeutic interchange from home Zantac)

## 2017-12-13 NOTE — H&P ADULT - NSHPSOCIALHISTORY_GEN_ALL_CORE
Patient denies any smoking or illicit drugs use. Admits to occasionally drinking a glass of wine with her friend. patient lives alone and ambulates using her cane. She has a neighbor who assists her in some of her ADLs.

## 2017-12-13 NOTE — ED ADULT NURSE REASSESSMENT NOTE - NS ED NURSE REASSESS COMMENT FT1
pt. a&ox3. pt. eating lunch at this merly. pt. denies pain or discomfort at this merly. pt. in no apparent distress. vss. as documented. awaiting bed. will continue to monitor . pt. a&ox3. pt. eating lunch at this merly. pt. denies pain or discomfort at this time. pt. in no apparent distress. vss. as documented. awaiting bed. will continue to monitor .

## 2017-12-13 NOTE — H&P ADULT - PROBLEM SELECTOR PLAN 1
patient with mild SOB and productive cough, likely secondary to RSV identified  CTA ruled out pneumonia and pulmonary embolism  Albuterol 1 puff q4 PRN SOB/Wheezing patient with mild SOB and productive cough, likely secondary to RSV identified  CTA ruled out pneumonia and pulmonary embolism  Albuterol nebs q4 PRN SOB/Wheezing  Tylenol PRN for HA and/or fever  c/w home Guaifenesin for cough

## 2017-12-13 NOTE — PROGRESS NOTE ADULT - ATTENDING COMMENTS
89F with a recent admission to [facility] for an upper respiratory infection    Viral syndrome - Supplemental oxygen as needed. Antitussive medications as needed.    Hypertension - Close blood pressure monitoring.    Hiatal hernia - Pepcid. Diet as tolerated.    Physical Therapy evaluation noted. Recommendation was for transfer to a rehabilitation facility upon discharge from the hospital.

## 2017-12-13 NOTE — H&P ADULT - NSHPPHYSICALEXAM_GEN_ALL_CORE
Vital Signs Last 24 Hrs  T(C): 36.9 (13 Dec 2017 02:59), Max: 37 (12 Dec 2017 10:39)  T(F): 98.4 (13 Dec 2017 02:59), Max: 98.6 (12 Dec 2017 10:39)  HR: 96 (13 Dec 2017 02:59) (96 - 106)  BP: 148/84 (13 Dec 2017 02:59) (144/89 - 148/84)  RR: 20 (13 Dec 2017 02:59) (16 - 28)  SpO2: 97% (13 Dec 2017 02:59) (94% - 97%)

## 2017-12-13 NOTE — H&P ADULT - NEGATIVE RESPIRATORY AND THORAX SYMPTOMS
no hemoptysis/no pleuritic chest pain/no dyspnea/no wheezing no hemoptysis/no wheezing/no pleuritic chest pain

## 2017-12-13 NOTE — ED ADULT NURSE REASSESSMENT NOTE - NS ED NURSE REASSESS COMMENT FT1
Pt is resting in bed comfortably at this time, no apparent distress noted at this time. pt is 95% on 2L NC. Pt denies any complaints at this time. Plan of care explained, will continue to monitor.

## 2017-12-13 NOTE — H&P ADULT - HISTORY OF PRESENT ILLNESS
89 y/o Female with PMH hiatal hernia, acid reflux, and HTN, presents with shortness of breath, cough productive of yellow sputum, and generalized weakness for 1 week. Patient recently discharged from [facility] yesterday after being admitted for RSV virus infection. Patient lives alone at home, ambulates using a cane, and she asked her neighbor who assists in her ADLs, to bring her to the ED because she was feeling short of breath and having a cough. Patient otherwise denies any nausea/vomiting, fever, sore throat, chest/abdominal pain, diarrhea, dysuria, sick contacts, or recent travel.     Of note patient, discharged on Proventil for her RSV infection however patient did not take this medication. Patient further evaluated by physical therapy at [facility] and it was recommended that the patient may benefit from [facility] and/or home PT services. 89 y/o Female with PMH hiatal hernia, acid reflux, and HTN, presents with shortness of breath, cough productive of yellow sputum, and generalized weakness for 1 week. Patient recently discharged from [facility] yesterday after being admitted for RSV virus infection. Patient lives alone at home, ambulates using a cane, and she asked her neighbor who assists in her ADLs, to bring her to the ED because she was feeling short of breath and having a cough. Patient otherwise denies any nausea/vomiting, fever, sore throat, chest/abdominal pain, diarrhea, dysuria, sick contacts, or recent travel.     Of note patient, discharged on Proventil for her RSV infection however patient did not take this medication. Patient further evaluated by physical therapy at [facility] and it was recommended that the patient may benefit from [facility] and/or home PT services. Patient initially refused rehab services but she is unable to care for herself as she feels weak and she is now agreeable for rehab or further placement.

## 2017-12-13 NOTE — PROGRESS NOTE ADULT - PROBLEM SELECTOR PLAN 5
Patient lives alone and needs assistance while at home.  She receives help from her neighbor to buy groceries, cook and complete her ADL's.  Prev eval at [facility] recommended A[facility]LIN and/or home PT services.   May need LT Placement.  Case Management and Social Work Consults ordered.

## 2017-12-13 NOTE — H&P ADULT - ATTENDING COMMENTS
Patient seen and evaluated in ER for Viral Syndrome. She was recently discharged after being treated for RSV. Continue supportive care. She lives alone - needs PT eval and possible AY[facility].

## 2017-12-13 NOTE — ED ADULT NURSE REASSESSMENT NOTE - NS ED NURSE REASSESS COMMENT FT1
pt. received from night RN. pt. a&ox3. pt. in no apparent distress at this time. pt. states her pain has gotten better. awaiting bed. will continue to monitor.

## 2017-12-13 NOTE — H&P ADULT - PROBLEM SELECTOR PLAN 3
- patient lives alone at home  - gets help from her neighbor to buy groceries and cook/complete her ADLs  - may need permanent placement  - PT eval - previous eval at [facility] recommended [facility] and/or home PT services.  - Social work consult

## 2017-12-13 NOTE — PHYSICAL THERAPY INITIAL EVALUATION ADULT - ADDITIONAL COMMENTS
Pt lives in a house with 2 steps to enter (+) HR and a step into kitchen. She states her neighbor will go food shopping for her if she needs it otherwise, pt is independent.

## 2017-12-13 NOTE — ED ADULT NURSE REASSESSMENT NOTE - NS ED NURSE REASSESS COMMENT FT1
Pt is resting in bed comfortably at this time, no apparent distress noted at this time. Pt denies any complaints at this time. pt is awaiting ct scan at this time. Plan of care explained, will continue to monitor.

## 2017-12-13 NOTE — ED ADULT NURSE REASSESSMENT NOTE - NS ED NURSE REASSESS COMMENT FT1
pt. denies pain or discomfort at this time. pt. in no apparent distress. vss. as documented. awaiting bed. will continue to monitor .

## 2017-12-13 NOTE — H&P ADULT - RS GEN PE MLT RESP DETAILS PC
breath sounds equal/respirations non-labored/good air movement/stridor/airway obstructed/inspiratory stridor in all lung fields, upper lobe>lower lobe/wheezes/no chest wall tenderness/no rales breath sounds equal/airway obstructed/stridor/wheezes/no chest wall tenderness/respirations non-labored/no rales/conducting sounds in all lung fields/good air movement

## 2017-12-13 NOTE — PROGRESS NOTE ADULT - PROBLEM SELECTOR PLAN 1
Pt with resolving SOB on supplemental O2 with scattered wheezes and cough.   CTA, pneu and PE ruled out, likely due to +RSV.  Continue Supplemental O2, Albuterol Nebs 0,083% q 4 hrs prn, Proventil 90 meq HFA inhaler 1 puff q 4 hrs prn.  Continue Tylenol PRN  and Robitussin for cough.

## 2017-12-13 NOTE — ED ADULT NURSE REASSESSMENT NOTE - NS ED NURSE REASSESS COMMENT FT1
MD. Pham made aware that patient's skin is reddened and bumps on groin area. cream to be applied to bottom and groin area.

## 2017-12-13 NOTE — H&P ADULT - ASSESSMENT
89 y/o Female with PMH hiatal hernia, acid reflux, and HTN, presents with shortness of breath cough productive of yellow sputum for 1 week. Patient recently discharged from [facility] yesterday after being admitted for RSV virus infection, pneumonia and pulmonary embolism ruled out via CTA imaging. patient admitted for management of probable resolving RSV infection and placement because she may require assistance in caring for herself as she lives alone.       Admit to any bed under hospitalist service - Dr. Garrett  Activity: ambulate with assistance   Dash/TLC diet

## 2017-12-14 PROCEDURE — 99233 SBSQ HOSP IP/OBS HIGH 50: CPT

## 2017-12-14 RX ORDER — CEFTRIAXONE 500 MG/1
1 INJECTION, POWDER, FOR SOLUTION INTRAMUSCULAR; INTRAVENOUS EVERY 24 HOURS
Qty: 0 | Refills: 0 | Status: DISCONTINUED | OUTPATIENT
Start: 2017-12-15 | End: 2017-12-15

## 2017-12-14 RX ORDER — CEFTRIAXONE 500 MG/1
1 INJECTION, POWDER, FOR SOLUTION INTRAMUSCULAR; INTRAVENOUS ONCE
Qty: 0 | Refills: 0 | Status: COMPLETED | OUTPATIENT
Start: 2017-12-14 | End: 2017-12-14

## 2017-12-14 RX ORDER — CEFTRIAXONE 500 MG/1
INJECTION, POWDER, FOR SOLUTION INTRAMUSCULAR; INTRAVENOUS
Qty: 0 | Refills: 0 | Status: DISCONTINUED | OUTPATIENT
Start: 2017-12-14 | End: 2017-12-15

## 2017-12-14 RX ORDER — AMLODIPINE BESYLATE 2.5 MG/1
10 TABLET ORAL DAILY
Qty: 0 | Refills: 0 | Status: DISCONTINUED | OUTPATIENT
Start: 2017-12-14 | End: 2017-12-15

## 2017-12-14 RX ORDER — SACCHAROMYCES BOULARDII 250 MG
250 POWDER IN PACKET (EA) ORAL
Qty: 0 | Refills: 0 | Status: DISCONTINUED | OUTPATIENT
Start: 2017-12-14 | End: 2017-12-15

## 2017-12-14 RX ORDER — INFLUENZA VIRUS VACCINE 15; 15; 15; 15 UG/.5ML; UG/.5ML; UG/.5ML; UG/.5ML
0.5 SUSPENSION INTRAMUSCULAR ONCE
Qty: 0 | Refills: 0 | Status: DISCONTINUED | OUTPATIENT
Start: 2017-12-14 | End: 2017-12-15

## 2017-12-14 RX ADMIN — FAMOTIDINE 20 MILLIGRAM(S): 10 INJECTION INTRAVENOUS at 18:24

## 2017-12-14 RX ADMIN — ENOXAPARIN SODIUM 40 MILLIGRAM(S): 100 INJECTION SUBCUTANEOUS at 11:51

## 2017-12-14 RX ADMIN — FAMOTIDINE 20 MILLIGRAM(S): 10 INJECTION INTRAVENOUS at 06:51

## 2017-12-14 RX ADMIN — Medication 650 MILLIGRAM(S): at 17:29

## 2017-12-14 RX ADMIN — Medication 250 MILLIGRAM(S): at 17:29

## 2017-12-14 RX ADMIN — Medication 200 MILLIGRAM(S): at 17:28

## 2017-12-14 RX ADMIN — CEFTRIAXONE 100 GRAM(S): 500 INJECTION, POWDER, FOR SOLUTION INTRAMUSCULAR; INTRAVENOUS at 09:25

## 2017-12-14 RX ADMIN — AMLODIPINE BESYLATE 5 MILLIGRAM(S): 2.5 TABLET ORAL at 06:50

## 2017-12-14 RX ADMIN — AMLODIPINE BESYLATE 10 MILLIGRAM(S): 2.5 TABLET ORAL at 17:29

## 2017-12-14 NOTE — PROGRESS NOTE ADULT - ASSESSMENT
89 y/o Female with PMH hiatal hernia, acid reflux, and HTN, presents with shortness of breath cough productive of yellow sputum for 1 week. Patient recently discharged from [facility] yesterday after being admitted for RSV virus infection, pneumonia and pulmonary embolism ruled out via CTA imaging. patient admitted for management of probable resolving RSV infection and placement because she may require assistance in caring for herself as she lives alone.       Admit to any bed under hospitalist service - Dr. Garrett  Activity: ambulate with assistance   Dash/TLC diet     Problem/Plan - 1:  ·  Problem: Viral syndrome.  Plan: patient with mild SOB and productive cough, likely secondary to RSV identified  CTA ruled out pneumonia and pulmonary embolism  Albuterol nebs q4 PRN SOB/Wheezing  Tylenol PRN for HA and/or fever  c/w home Guaifenesin for cough.   Check pulse ox on RA at rest and with ambulation. Patient does not have a current diagnosis which supports home oxygen, would need to go to [facility] until Viral syndrome resolves.      Problem/Plan - 2:  ·  Problem: Generalized weakness.  Plan: - likely secondary to viral syndrome  - supportive care, management as above.      Problem/Plan - 3:  ·  Problem: Needs assistance while at home.  Plan: - patient lives alone at home  - gets help from her neighbor to buy groceries and cook/complete her ADLs  - may need permanent placement  - PT eval - previous eval at [facility] recommended AYLIN and/or home PT services. PT consult here recommends AYLIN as well, patient not accepting of this at present.   - Social work consult.      Problem/Plan - 4:  ·  Problem: Hypertension.  Plan: - BP currently elevated   - increase to Amlodipine 10 mg PO daily with holding parameters.      Problem/Plan - 5:  ·  Problem: Hiatal hernia.  Plan: - patient with chronic acid reflux 2/2 hiatal hernia  -  famotidine (therapeutic interchange from home Zantac).      Problem/Plan - 6:  Problem: Preventive measure. Plan: DVT ppx: Lovenox 40 mg subq daily.    Problem/Plan -7:  Problem: UTI. Plan: Uncomplicated, started on Rocephin, florastor
HPI:  89 y/o Female with PMH hiatal hernia, acid reflux, and HTN, presents with shortness of breath, cough productive of yellow sputum, and generalized weakness for 1 week. Patient recently discharged from [facility] yesterday after being admitted for RSV virus infection. Patient lives alone at home, ambulates using a cane, and she asked her neighbor who assists in her ADLs, to bring her to the ED because she was feeling short of breath and having a cough. Patient otherwise denies any nausea/vomiting, fever, sore throat, chest/abdominal pain, diarrhea, dysuria, sick contacts, or recent travel.   Of note patient, discharged on Proventil for her RSV infection however patient did not take this medication. Patient further evaluated by physical therapy at [facility] and it was recommended that the patient may benefit from AYLIN and/or home PT services. Patient initially refused rehab services but she is unable to care for herself as she feels weak and she is now agreeable for rehab or further placement  The patient was diagnosed with Viral Syndrome and has had symptom improvement with supplemental O2, Nebulizers, Inhalers and Cough Medicine.  The Plan of Care was discussed with the patient and Dr. Atif Pham.

## 2017-12-15 ENCOUNTER — TRANSCRIPTION ENCOUNTER (OUTPATIENT)
Age: 82
End: 2017-12-15

## 2017-12-15 VITALS
SYSTOLIC BLOOD PRESSURE: 124 MMHG | RESPIRATION RATE: 18 BRPM | DIASTOLIC BLOOD PRESSURE: 66 MMHG | TEMPERATURE: 98 F | OXYGEN SATURATION: 95 % | HEART RATE: 89 BPM

## 2017-12-15 DIAGNOSIS — B97.4 RESPIRATORY SYNCYTIAL VIRUS AS THE CAUSE OF DISEASES CLASSIFIED ELSEWHERE: ICD-10-CM

## 2017-12-15 DIAGNOSIS — I10 ESSENTIAL (PRIMARY) HYPERTENSION: ICD-10-CM

## 2017-12-15 DIAGNOSIS — K21.9 GASTRO-ESOPHAGEAL REFLUX DISEASE WITHOUT ESOPHAGITIS: ICD-10-CM

## 2017-12-15 DIAGNOSIS — R65.20 SEVERE SEPSIS WITHOUT SEPTIC SHOCK: ICD-10-CM

## 2017-12-15 DIAGNOSIS — E87.1 HYPO-OSMOLALITY AND HYPONATREMIA: ICD-10-CM

## 2017-12-15 DIAGNOSIS — R06.02 SHORTNESS OF BREATH: ICD-10-CM

## 2017-12-15 DIAGNOSIS — Z88.8 ALLERGY STATUS TO OTHER DRUGS, MEDICAMENTS AND BIOLOGICAL SUBSTANCES STATUS: ICD-10-CM

## 2017-12-15 DIAGNOSIS — Z88.5 ALLERGY STATUS TO NARCOTIC AGENT: ICD-10-CM

## 2017-12-15 DIAGNOSIS — A41.9 SEPSIS, UNSPECIFIED ORGANISM: ICD-10-CM

## 2017-12-15 DIAGNOSIS — K44.9 DIAPHRAGMATIC HERNIA WITHOUT OBSTRUCTION OR GANGRENE: ICD-10-CM

## 2017-12-15 DIAGNOSIS — J06.9 ACUTE UPPER RESPIRATORY INFECTION, UNSPECIFIED: ICD-10-CM

## 2017-12-15 DIAGNOSIS — E87.6 HYPOKALEMIA: ICD-10-CM

## 2017-12-15 LAB
ANION GAP SERPL CALC-SCNC: 13 MMOL/L — SIGNIFICANT CHANGE UP (ref 5–17)
BUN SERPL-MCNC: 9 MG/DL — SIGNIFICANT CHANGE UP (ref 8–20)
CALCIUM SERPL-MCNC: 8.6 MG/DL — SIGNIFICANT CHANGE UP (ref 8.6–10.2)
CHLORIDE SERPL-SCNC: 99 MMOL/L — SIGNIFICANT CHANGE UP (ref 98–107)
CO2 SERPL-SCNC: 26 MMOL/L — SIGNIFICANT CHANGE UP (ref 22–29)
CREAT SERPL-MCNC: 0.54 MG/DL — SIGNIFICANT CHANGE UP (ref 0.5–1.3)
GLUCOSE SERPL-MCNC: 90 MG/DL — SIGNIFICANT CHANGE UP (ref 70–115)
HCT VFR BLD CALC: 40.1 % — SIGNIFICANT CHANGE UP (ref 37–47)
HGB BLD-MCNC: 13.1 G/DL — SIGNIFICANT CHANGE UP (ref 12–16)
MAGNESIUM SERPL-MCNC: 2.1 MG/DL — SIGNIFICANT CHANGE UP (ref 1.6–2.6)
MCHC RBC-ENTMCNC: 31.5 PG — HIGH (ref 27–31)
MCHC RBC-ENTMCNC: 32.7 G/DL — SIGNIFICANT CHANGE UP (ref 32–36)
MCV RBC AUTO: 96.4 FL — SIGNIFICANT CHANGE UP (ref 81–99)
PHOSPHATE SERPL-MCNC: 3.5 MG/DL — SIGNIFICANT CHANGE UP (ref 2.4–4.7)
PLATELET # BLD AUTO: 288 K/UL — SIGNIFICANT CHANGE UP (ref 150–400)
POTASSIUM SERPL-MCNC: 3.7 MMOL/L — SIGNIFICANT CHANGE UP (ref 3.5–5.3)
POTASSIUM SERPL-SCNC: 3.7 MMOL/L — SIGNIFICANT CHANGE UP (ref 3.5–5.3)
RBC # BLD: 4.16 M/UL — LOW (ref 4.4–5.2)
RBC # FLD: 12.6 % — SIGNIFICANT CHANGE UP (ref 11–15.6)
SODIUM SERPL-SCNC: 138 MMOL/L — SIGNIFICANT CHANGE UP (ref 135–145)
WBC # BLD: 5.8 K/UL — SIGNIFICANT CHANGE UP (ref 4.8–10.8)
WBC # FLD AUTO: 5.8 K/UL — SIGNIFICANT CHANGE UP (ref 4.8–10.8)

## 2017-12-15 PROCEDURE — 99239 HOSP IP/OBS DSCHRG MGMT >30: CPT

## 2017-12-15 RX ORDER — ALBUTEROL 90 UG/1
1 AEROSOL, METERED ORAL
Qty: 0 | Refills: 0 | COMMUNITY
Start: 2017-12-15

## 2017-12-15 RX ORDER — SACCHAROMYCES BOULARDII 250 MG
1 POWDER IN PACKET (EA) ORAL
Qty: 6 | Refills: 0 | OUTPATIENT
Start: 2017-12-15 | End: 2017-12-17

## 2017-12-15 RX ORDER — ACETAMINOPHEN 500 MG
2 TABLET ORAL
Qty: 0 | Refills: 0 | COMMUNITY
Start: 2017-12-15

## 2017-12-15 RX ORDER — AMLODIPINE BESYLATE 2.5 MG/1
1 TABLET ORAL
Qty: 0 | Refills: 0 | COMMUNITY
Start: 2017-12-15

## 2017-12-15 RX ADMIN — AMLODIPINE BESYLATE 10 MILLIGRAM(S): 2.5 TABLET ORAL at 05:21

## 2017-12-15 RX ADMIN — Medication 250 MILLIGRAM(S): at 05:21

## 2017-12-15 RX ADMIN — CEFTRIAXONE 100 GRAM(S): 500 INJECTION, POWDER, FOR SOLUTION INTRAMUSCULAR; INTRAVENOUS at 09:20

## 2017-12-15 RX ADMIN — ENOXAPARIN SODIUM 40 MILLIGRAM(S): 100 INJECTION SUBCUTANEOUS at 11:28

## 2017-12-15 RX ADMIN — FAMOTIDINE 20 MILLIGRAM(S): 10 INJECTION INTRAVENOUS at 05:21

## 2017-12-15 NOTE — DISCHARGE NOTE ADULT - HOSPITAL COURSE
89 y/o Female with PMH hiatal hernia, acid reflux, and HTN, presents with shortness of breath cough productive of yellow sputum for 1 week. Patient recently discharged from [facility] yesterday after being admitted for RSV virus infection, pneumonia and pulmonary embolism ruled out via CTA imaging. Patient admitted for management of probable resolving RSV infection and placement because she requires assistance in caring for herself as she lives alone. U/A reveals UTI, treated with Rocephin IV x 2 days then changed to Levaquin 500 mg daily x 3 more days. Patient was seen by PT and recommend AYLIN. Patient now agreeable to discharge. Amlodipine increased to 10 mg daily, BP now well controlled. The patient is stable for discharge.   Vital Signs Last 24 Hrs  T(C): 36.8 (15 Dec 2017 07:45), Max: 37.1 (15 Dec 2017 01:00)  T(F): 98.2 (15 Dec 2017 07:45), Max: 98.7 (15 Dec 2017 01:00)  HR: 83 (15 Dec 2017 07:45) (83 - 98)  BP: 115/61 (15 Dec 2017 07:45) (115/61 - 138/84)  BP(mean): --  RR: 20 (15 Dec 2017 07:45) (16 - 20)  SpO2: 97% (15 Dec 2017 07:45) (96% - 99%)                            13.1   5.8   )-----------( 288      ( 15 Dec 2017 07:03 )             40.1     15 Dec 2017 07:03    138    |  99     |  9.0    ----------------------------<  90     3.7     |  26.0   |  0.54     Ca    8.6        15 Dec 2017 07:03  Phos  3.5       15 Dec 2017 07:03  Mg     2.1       15 Dec 2017 07:03        CAPILLARY BLOOD GLUCOSE          Urinalysis Basic - ( 13 Dec 2017 15:22 )    Color: Yellow / Appearance: Clear / S.010 / pH: x  Gluc: x / Ketone: Trace  / Bili: Negative / Urobili: Negative mg/dL   Blood: x / Protein: 30 mg/dL / Nitrite: Negative   Leuk Esterase: Moderate / RBC: 0-2 /HPF / WBC 3-5   Sq Epi: x / Non Sq Epi: Occasional / Bacteria: Few      PHYSICAL EXAM:    General: Well developed; well nourished; in no acute distress  Respiratory: Coarse BS, clears with cough, no rales, wheezes  Cardiovascular: Regular rate and rhythm. S1 and S2 Normal; No murmurs, gallops or rubs  Gastrointestinal: Soft non-tender non-distended; Normal bowel sounds; No hepatosplenomegaly  Extremities: Normal range of motion, No clubbing, cyanosis or edema  Vascular: Peripheral pulses palpable 2+ bilaterally  Neurological: Alert and oriented x4, no focal deficits  Skin: Warm and dry. No acute rash  Psychiatric: Cooperative and appropriate

## 2017-12-15 NOTE — DISCHARGE NOTE ADULT - MEDICATION SUMMARY - MEDICATIONS TO CHANGE
I will SWITCH the dose or number of times a day I take the medications listed below when I get home from the hospital:    Proventil HFA 90 mcg/inh inhalation aerosol  -- 2 puff(s) inhaled 4 times a day   -- For inhalation only.  It is very important that you take or use this exactly as directed.  Do not skip doses or discontinue unless directed by your doctor.  Obtain medical advice before taking any non-prescription drugs as some may affect the action of this medication.  Shake well before use.    amLODIPine 5 mg oral tablet  -- 1 tab(s) by mouth once a day

## 2017-12-15 NOTE — DISCHARGE NOTE ADULT - PATIENT PORTAL LINK FT
“You can access the FollowHealth Patient Portal, offered by [facility], by registering with the following website: [email or web address]”

## 2017-12-15 NOTE — DISCHARGE NOTE ADULT - PLAN OF CARE
resolution of viral syndrome Recent diagnosis of RSV virus  Discharge to [facility]  PT  Albuterol prn discharge to [facility] Zantac complete Levaquin x 3 days for total of 5 days antibiotics  continue Florastor while on antibiotics Amlodipine increased to 10 mg daily  Low sodium diet

## 2017-12-15 NOTE — DISCHARGE NOTE ADULT - MEDICATION SUMMARY - MEDICATIONS TO TAKE
I will START or STAY ON the medications listed below when I get home from the hospital:    acetaminophen 325 mg oral tablet  -- 2 tab(s) by mouth every 6 hours, As needed, Pain and/or Fever  -- Indication: For Pain    albuterol 2.5 mg/3 mL (0.083%) inhalation solution  -- 1 dose(s) inhaled every 6 hours, As Needed  -- Indication: For Shortness of breath    amLODIPine 10 mg oral tablet  -- 1 tab(s) by mouth once a day  -- Indication: For Hypertension    guaiFENesin 100 mg/5 mL oral liquid  -- 10 milliliter(s) by mouth every 6 hours, As needed, Cough  -- Indication: For Cough    Zantac 15 mg/mL oral syrup  -- 75 mg by mouth 2 times a day  -- Indication: For GeRD    Florastor 250 mg oral capsule  -- 1 cap(s) by mouth 2 times a day  -- Indication: For C diff prophylaxis    Levaquin 500 mg oral tablet  -- 1 tab(s) by mouth every 24 hours x 3 days  -- Indication: For UTI

## 2017-12-15 NOTE — DISCHARGE NOTE ADULT - CARE PLAN
Principal Discharge DX:	Viral syndrome  Goal:	resolution of viral syndrome  Instructions for follow-up, activity and diet:	Recent diagnosis of RSV virus  Discharge to [facility]  Albuterol prn  Secondary Diagnosis:	Needs assistance while at home  Instructions for follow-up, activity and diet:	discharge to [facility]  PT  Secondary Diagnosis:	Hiatal hernia  Instructions for follow-up, activity and diet:	Zantac  Secondary Diagnosis:	UTI (urinary tract infection)  Instructions for follow-up, activity and diet:	complete Levaquin x 3 days for total of 5 days antibiotics  continue Florastor while on antibiotics  Secondary Diagnosis:	Hypertension  Instructions for follow-up, activity and diet:	Amlodipine increased to 10 mg daily  Low sodium diet

## 2017-12-18 DIAGNOSIS — E43 UNSPECIFIED SEVERE PROTEIN-CALORIE MALNUTRITION: ICD-10-CM

## 2017-12-18 LAB
CULTURE RESULTS: SIGNIFICANT CHANGE UP
CULTURE RESULTS: SIGNIFICANT CHANGE UP
SPECIMEN SOURCE: SIGNIFICANT CHANGE UP
SPECIMEN SOURCE: SIGNIFICANT CHANGE UP

## 2017-12-19 PROCEDURE — 71045 X-RAY EXAM CHEST 1 VIEW: CPT

## 2017-12-19 PROCEDURE — 81001 URINALYSIS AUTO W/SCOPE: CPT

## 2017-12-19 PROCEDURE — 99285 EMERGENCY DEPT VISIT HI MDM: CPT | Mod: 25

## 2017-12-19 PROCEDURE — 80048 BASIC METABOLIC PNL TOTAL CA: CPT

## 2017-12-19 PROCEDURE — 83735 ASSAY OF MAGNESIUM: CPT

## 2017-12-19 PROCEDURE — 83605 ASSAY OF LACTIC ACID: CPT

## 2017-12-19 PROCEDURE — 71275 CT ANGIOGRAPHY CHEST: CPT

## 2017-12-19 PROCEDURE — 87040 BLOOD CULTURE FOR BACTERIA: CPT

## 2017-12-19 PROCEDURE — 83880 ASSAY OF NATRIURETIC PEPTIDE: CPT

## 2017-12-19 PROCEDURE — 97163 PT EVAL HIGH COMPLEX 45 MIN: CPT

## 2017-12-19 PROCEDURE — 84484 ASSAY OF TROPONIN QUANT: CPT

## 2017-12-19 PROCEDURE — 36415 COLL VENOUS BLD VENIPUNCTURE: CPT

## 2017-12-19 PROCEDURE — 74177 CT ABD & PELVIS W/CONTRAST: CPT

## 2017-12-19 PROCEDURE — 80053 COMPREHEN METABOLIC PANEL: CPT

## 2017-12-19 PROCEDURE — 85379 FIBRIN DEGRADATION QUANT: CPT

## 2017-12-19 PROCEDURE — 84100 ASSAY OF PHOSPHORUS: CPT

## 2017-12-19 PROCEDURE — 85027 COMPLETE CBC AUTOMATED: CPT

## 2018-01-08 ENCOUNTER — TRANSCRIPTION ENCOUNTER (OUTPATIENT)
Age: 83
End: 2018-01-08

## 2018-03-21 ENCOUNTER — TRANSCRIPTION ENCOUNTER (OUTPATIENT)
Age: 83
End: 2018-03-21

## 2018-03-21 ENCOUNTER — INPATIENT (INPATIENT)
Facility: HOSPITAL | Age: 83
LOS: 2 days | Discharge: HOME | DRG: 65 | End: 2018-03-24
Attending: INTERNAL MEDICINE | Admitting: INTERNAL MEDICINE
Payer: MEDICARE

## 2018-03-21 VITALS
SYSTOLIC BLOOD PRESSURE: 165 MMHG | TEMPERATURE: 98 F | OXYGEN SATURATION: 97 % | DIASTOLIC BLOOD PRESSURE: 83 MMHG | WEIGHT: 139.99 LBS | HEIGHT: 62 IN | HEART RATE: 82 BPM | RESPIRATION RATE: 20 BRPM

## 2018-03-21 DIAGNOSIS — I10 ESSENTIAL (PRIMARY) HYPERTENSION: ICD-10-CM

## 2018-03-21 DIAGNOSIS — Z90.89 ACQUIRED ABSENCE OF OTHER ORGANS: Chronic | ICD-10-CM

## 2018-03-21 DIAGNOSIS — I69.941 MONOPLEGIA OF LOWER LIMB FOLLOWING UNSPECIFIED CEREBROVASCULAR DISEASE AFFECTING RIGHT DOMINANT SIDE: ICD-10-CM

## 2018-03-21 DIAGNOSIS — G45.9 TRANSIENT CEREBRAL ISCHEMIC ATTACK, UNSPECIFIED: ICD-10-CM

## 2018-03-21 DIAGNOSIS — Z90.49 ACQUIRED ABSENCE OF OTHER SPECIFIED PARTS OF DIGESTIVE TRACT: Chronic | ICD-10-CM

## 2018-03-21 DIAGNOSIS — Z90.721 ACQUIRED ABSENCE OF OVARIES, UNILATERAL: Chronic | ICD-10-CM

## 2018-03-21 LAB
ALBUMIN SERPL ELPH-MCNC: 4.2 G/DL — SIGNIFICANT CHANGE UP (ref 3.3–5.2)
ALP SERPL-CCNC: 73 U/L — SIGNIFICANT CHANGE UP (ref 40–120)
ALT FLD-CCNC: 14 U/L — SIGNIFICANT CHANGE UP
ANION GAP SERPL CALC-SCNC: 13 MMOL/L — SIGNIFICANT CHANGE UP (ref 5–17)
APPEARANCE UR: CLEAR — SIGNIFICANT CHANGE UP
APTT BLD: 29.7 SEC — SIGNIFICANT CHANGE UP (ref 27.5–37.4)
AST SERPL-CCNC: 35 U/L — HIGH
BASOPHILS # BLD AUTO: 0 K/UL — SIGNIFICANT CHANGE UP (ref 0–0.2)
BASOPHILS NFR BLD AUTO: 0.7 % — SIGNIFICANT CHANGE UP (ref 0–2)
BILIRUB SERPL-MCNC: 0.4 MG/DL — SIGNIFICANT CHANGE UP (ref 0.4–2)
BILIRUB UR-MCNC: NEGATIVE — SIGNIFICANT CHANGE UP
BUN SERPL-MCNC: 9 MG/DL — SIGNIFICANT CHANGE UP (ref 8–20)
CALCIUM SERPL-MCNC: 8.9 MG/DL — SIGNIFICANT CHANGE UP (ref 8.6–10.2)
CHLORIDE SERPL-SCNC: 94 MMOL/L — LOW (ref 98–107)
CO2 SERPL-SCNC: 25 MMOL/L — SIGNIFICANT CHANGE UP (ref 22–29)
COLOR SPEC: YELLOW — SIGNIFICANT CHANGE UP
CREAT SERPL-MCNC: 0.55 MG/DL — SIGNIFICANT CHANGE UP (ref 0.5–1.3)
DIFF PNL FLD: NEGATIVE — SIGNIFICANT CHANGE UP
EOSINOPHIL # BLD AUTO: 0 K/UL — SIGNIFICANT CHANGE UP (ref 0–0.5)
EOSINOPHIL NFR BLD AUTO: 0.7 % — SIGNIFICANT CHANGE UP (ref 0–6)
EPI CELLS # UR: SIGNIFICANT CHANGE UP
GLUCOSE SERPL-MCNC: 100 MG/DL — SIGNIFICANT CHANGE UP (ref 70–115)
GLUCOSE UR QL: NEGATIVE MG/DL — SIGNIFICANT CHANGE UP
HBA1C BLD-MCNC: 5.1 % — SIGNIFICANT CHANGE UP (ref 4–5.6)
HCT VFR BLD CALC: 42.5 % — SIGNIFICANT CHANGE UP (ref 37–47)
HGB BLD-MCNC: 14.5 G/DL — SIGNIFICANT CHANGE UP (ref 12–16)
INR BLD: 1.15 RATIO — SIGNIFICANT CHANGE UP (ref 0.88–1.16)
KETONES UR-MCNC: NEGATIVE — SIGNIFICANT CHANGE UP
LEUKOCYTE ESTERASE UR-ACNC: ABNORMAL
LYMPHOCYTES # BLD AUTO: 1 K/UL — SIGNIFICANT CHANGE UP (ref 1–4.8)
LYMPHOCYTES # BLD AUTO: 18.2 % — LOW (ref 20–55)
MCHC RBC-ENTMCNC: 32.7 PG — HIGH (ref 27–31)
MCHC RBC-ENTMCNC: 34.1 G/DL — SIGNIFICANT CHANGE UP (ref 32–36)
MCV RBC AUTO: 95.7 FL — SIGNIFICANT CHANGE UP (ref 81–99)
MONOCYTES # BLD AUTO: 0.5 K/UL — SIGNIFICANT CHANGE UP (ref 0–0.8)
MONOCYTES NFR BLD AUTO: 8.2 % — SIGNIFICANT CHANGE UP (ref 3–10)
NEUTROPHILS # BLD AUTO: 4.1 K/UL — SIGNIFICANT CHANGE UP (ref 1.8–8)
NEUTROPHILS NFR BLD AUTO: 72 % — SIGNIFICANT CHANGE UP (ref 37–73)
NITRITE UR-MCNC: NEGATIVE — SIGNIFICANT CHANGE UP
PH UR: 8 — SIGNIFICANT CHANGE UP (ref 5–8)
PLATELET # BLD AUTO: 252 K/UL — SIGNIFICANT CHANGE UP (ref 150–400)
POTASSIUM SERPL-MCNC: 5.4 MMOL/L — HIGH (ref 3.5–5.3)
POTASSIUM SERPL-SCNC: 5.4 MMOL/L — HIGH (ref 3.5–5.3)
PROT SERPL-MCNC: 7.6 G/DL — SIGNIFICANT CHANGE UP (ref 6.6–8.7)
PROT UR-MCNC: 15 MG/DL
PROTHROM AB SERPL-ACNC: 12.7 SEC — SIGNIFICANT CHANGE UP (ref 9.8–12.7)
RBC # BLD: 4.44 M/UL — SIGNIFICANT CHANGE UP (ref 4.4–5.2)
RBC # FLD: 13.9 % — SIGNIFICANT CHANGE UP (ref 11–15.6)
SODIUM SERPL-SCNC: 132 MMOL/L — LOW (ref 135–145)
SP GR SPEC: 1.01 — SIGNIFICANT CHANGE UP (ref 1.01–1.02)
TROPONIN T SERPL-MCNC: <0.01 NG/ML — SIGNIFICANT CHANGE UP (ref 0–0.06)
UROBILINOGEN FLD QL: NEGATIVE MG/DL — SIGNIFICANT CHANGE UP
WBC # BLD: 5.7 K/UL — SIGNIFICANT CHANGE UP (ref 4.8–10.8)
WBC # FLD AUTO: 5.7 K/UL — SIGNIFICANT CHANGE UP (ref 4.8–10.8)
WBC UR QL: SIGNIFICANT CHANGE UP

## 2018-03-21 PROCEDURE — 93010 ELECTROCARDIOGRAM REPORT: CPT

## 2018-03-21 PROCEDURE — 99285 EMERGENCY DEPT VISIT HI MDM: CPT

## 2018-03-21 PROCEDURE — 70450 CT HEAD/BRAIN W/O DYE: CPT | Mod: 26

## 2018-03-21 PROCEDURE — 99223 1ST HOSP IP/OBS HIGH 75: CPT | Mod: AI

## 2018-03-21 PROCEDURE — 72125 CT NECK SPINE W/O DYE: CPT | Mod: 26

## 2018-03-21 RX ORDER — CHOLECALCIFEROL (VITAMIN D3) 125 MCG
1000 CAPSULE ORAL DAILY
Qty: 0 | Refills: 0 | Status: DISCONTINUED | OUTPATIENT
Start: 2018-03-21 | End: 2018-03-23

## 2018-03-21 RX ORDER — PANTOPRAZOLE SODIUM 20 MG/1
40 TABLET, DELAYED RELEASE ORAL
Qty: 0 | Refills: 0 | Status: DISCONTINUED | OUTPATIENT
Start: 2018-03-21 | End: 2018-03-24

## 2018-03-21 RX ORDER — ACETAMINOPHEN 500 MG
650 TABLET ORAL EVERY 6 HOURS
Qty: 0 | Refills: 0 | Status: DISCONTINUED | OUTPATIENT
Start: 2018-03-21 | End: 2018-03-24

## 2018-03-21 RX ORDER — ATORVASTATIN CALCIUM 80 MG/1
40 TABLET, FILM COATED ORAL AT BEDTIME
Qty: 0 | Refills: 0 | Status: DISCONTINUED | OUTPATIENT
Start: 2018-03-21 | End: 2018-03-24

## 2018-03-21 RX ORDER — SODIUM CHLORIDE 9 MG/ML
500 INJECTION INTRAMUSCULAR; INTRAVENOUS; SUBCUTANEOUS ONCE
Qty: 0 | Refills: 0 | Status: COMPLETED | OUTPATIENT
Start: 2018-03-21 | End: 2018-03-21

## 2018-03-21 RX ORDER — ASPIRIN/CALCIUM CARB/MAGNESIUM 324 MG
81 TABLET ORAL DAILY
Qty: 0 | Refills: 0 | Status: DISCONTINUED | OUTPATIENT
Start: 2018-03-21 | End: 2018-03-24

## 2018-03-21 RX ORDER — ATORVASTATIN CALCIUM 80 MG/1
20 TABLET, FILM COATED ORAL AT BEDTIME
Qty: 0 | Refills: 0 | Status: DISCONTINUED | OUTPATIENT
Start: 2018-03-21 | End: 2018-03-21

## 2018-03-21 RX ORDER — ENOXAPARIN SODIUM 100 MG/ML
40 INJECTION SUBCUTANEOUS DAILY
Qty: 0 | Refills: 0 | Status: DISCONTINUED | OUTPATIENT
Start: 2018-03-21 | End: 2018-03-24

## 2018-03-21 RX ADMIN — SODIUM CHLORIDE 500 MILLILITER(S): 9 INJECTION INTRAMUSCULAR; INTRAVENOUS; SUBCUTANEOUS at 10:33

## 2018-03-21 RX ADMIN — PANTOPRAZOLE SODIUM 40 MILLIGRAM(S): 20 TABLET, DELAYED RELEASE ORAL at 20:01

## 2018-03-21 NOTE — DISCHARGE NOTE ADULT - CARE PROVIDER_API CALL
Arnold Osborne (CORNELIUS), Neurology  [address] [place] [address]  Phone: [phone]  Fax: [phone]

## 2018-03-21 NOTE — DISCHARGE NOTE ADULT - CARE PLAN
Principal Discharge DX:	Cerebrovascular accident (CVA), unspecified mechanism  Goal:	Prevent further strokes  Assessment and plan of treatment:	Add aspirin and statin. Patient scheduled for transfer to rehab on 3/24.  Secondary Diagnosis:	Chronic diastolic (congestive) heart failure  Goal:	As per echocardiogram  Assessment and plan of treatment:	Follow up with cardiology as an outpatient. She is currently stable on her present regimen.  Secondary Diagnosis:	Essential hypertension  Goal:	BP stable on current regimen  Secondary Diagnosis:	Hiatal hernia Principal Discharge DX:	Cerebrovascular accident (CVA), unspecified mechanism  Goal:	Prevent further strokes  Assessment and plan of treatment:	Add aspirin and statin. Patient scheduled for transfer to rehab on 3/24.  Secondary Diagnosis:	Chronic diastolic (congestive) heart failure  Goal:	As per echocardiogram  Assessment and plan of treatment:	Follow up with cardiology as an outpatient. She is currently stable on her present regimen.  Secondary Diagnosis:	Essential hypertension  Goal:	BP stable on current regimen  Secondary Diagnosis:	Hiatal hernia  Goal:	I would attempt to discontinue protonix to reduce pill burden but she may need to resume antacid if she becomes symptomatic.

## 2018-03-21 NOTE — DISCHARGE NOTE ADULT - PATIENT PORTAL LINK FT
You can access the [email or web address] Patient Portal, offered by [facility], by registering with the following website: [email or web address]

## 2018-03-21 NOTE — CONSULT NOTE ADULT - ASSESSMENT
Patient presented s/p legs giving out at home, developed worsening right LE weakness in Er, D/D may include worsening cva, also r/o spine issues , recommend mri brain and T_lspine. further managment will depend on if mri shows cva or not.

## 2018-03-21 NOTE — CONSULT NOTE ADULT - SUBJECTIVE AND OBJECTIVE BOX
HPI:    89 yo, tried to stand up at 815 am, both legs gave out and slid down on the floor, could not stand up, patient reached phone by crawling and call ambulance. In the er felt right leg became numb from knee down and also fels whole right leg became weaker. Denies any weakness numbess in the hands or slurred speech, patient does admit low back pain but denies any recent worsening.   PAST MEDICAL & SURGICAL HISTORY:  Hypertension  Hiatal hernia  S/P appendectomy  H/O oophorectomy  History of tonsillectomy      REVIEW OF SYSTEMS:    CONSTITUTIONAL: No fever, weight loss, or fatigue  EYES: No eye pain, visual disturbances, or discharge  ENMT:  No difficulty hearing, tinnitus, vertigo; No sinus or throat pain  NECK: No pain or stiffness  RESPIRATORY: No cough, wheezing, chills or hemoptysis; No shortness of breath  CARDIOVASCULAR: No chest pain, palpitations, dizziness, or leg swelling  GASTROINTESTINAL:  No nausea, vomiting, or hematemesis; No diarrhea or constipation. No melena or hematochezia.  GENITOURINARY: No dysuria, frequency, hematuria, or incontinence  NEUROLOGICAL: No headaches, memory loss.   SKIN: No itching, burning, rashes, or lesions   LYMPH NODES: No enlarged glands  ENDOCRINE: No heat or cold intolerance; No hair loss  PSYCHIATRIC: No depression, anxiety, mood swings, or difficulty sleeping  HEME/LYMPH: No easy bruising, or bleeding gums    MEDICATIONS  (STANDING):    MEDICATIONS  (PRN):      Allergies    codeine (Unknown)  Medrol (Unknown)  morphine (Unknown)  opium (Unknown)  predniSONE (Unknown)    Intolerances        SOCIAL HISTORY:    FAMILY HISTORY:  No pertinent family history in first degree relatives      PHYSICAL EXAM:  Vital Signs Last 24 Hrs  T(F): 97 (18 @ 11:14)  HR: 91 (18 @ 11:14)  BP: 149/75 (18 @ 11:14)  RR: 18 (18 @ 11:14)    GENERAL: NAD, well-groomed, well-developed  HEAD:  Atraumatic, Normocephalic  EYES: EOMI, PERRLA, conjunctiva and sclera clear  NECK: Supple, No JVD, Normal thyroid, no carotid bruit bilateral  NERVOUS SYSTEM:  Alert & Oriented X3, speech and language normal, cranial nerves II-XII normal,   Good concentration; Right LE 1-2/5, left LE 4+/5, right finger  4+/5, left finger  5/5, DTR absent all over, babinki neutral, pp dec right le below knee.         LABS:                        14.5   5.7   )-----------( 252      ( 21 Mar 2018 10:12 )             42.5     03-    132<L>  |  94<L>  |  9.0  ----------------------------<  100  5.4<H>   |  25.0  |  0.55    Ca    8.9      21 Mar 2018 10:12    TPro  7.6  /  Alb  4.2  /  TBili  0.4  /  DBili  x   /  AST  35<H>  /  ALT  14  /  AlkPhos  73  -    PT/INR - ( 21 Mar 2018 10:12 )   PT: 12.7 sec;   INR: 1.15 ratio         PTT - ( 21 Mar 2018 10:12 )  PTT:29.7 sec  Urinalysis Basic - ( 21 Mar 2018 11:09 )    Color: Yellow / Appearance: Clear / S.010 / pH: x  Gluc: x / Ketone: Negative  / Bili: Negative / Urobili: Negative mg/dL   Blood: x / Protein: 15 mg/dL / Nitrite: Negative   Leuk Esterase: Trace / RBC: x / WBC 0-2   Sq Epi: x / Non Sq Epi: Occasional / Bacteria: x        RADIOLOGY & ADDITIONAL STUDIES:

## 2018-03-21 NOTE — ED ADULT NURSE REASSESSMENT NOTE - NS ED NURSE REASSESS COMMENT FT1
Dr Ashley Champagne at bedside.
Shana is pt's daughter and can be reached at [phone]
report received care assumed, pt sitting up in bed eating ,tolerating po without difficulty
Pt comfortable in stretcher, respirations are even & unlabored. Pt offers no complaints at this time.
Pt provided lunch box, tolerated well. Pt remains  A& OX4, pt waiting for admit orders and dispo to unit. Pt aware of plan of care.

## 2018-03-21 NOTE — DISCHARGE NOTE ADULT - HOSPITAL COURSE
89 year old female with pmh of GERD, admitted for CVA, CT negative, MRI noted to be positive for acute stroke. Patient is clinically stable and pending [facility]     Problem/Plan - 1:  ·  Problem: Transient cerebral ischemia, unspecified type.  Plan: c/w aspirin, statin  CT head negative, mri positive for small, acute infarct posterior left frontal white matter in the subcortical region, echo shows chronic diastolic heart failure with preserved ejection fraction,  carotid duplex showed mild plague no hemodynamic abnormality  PT eval rec acute rehab, however patient insists on [facility], will therefore be sent to [facility]  Patient is extremely high risk as she has flaccid paralysis of right lower extremity.      Problem/Plan - 2:  ·  Problem: Essential hypertension.  Plan: Initially with low BP, on admission likely due to hypovolemia  BP stable for now, occasionally SBP >150, will continue to monitor, continue to hold BP meds considering risk for hypovolemia --> hypoperfusion, and advanced age.      Problem/Plan - 3:  ·  Problem: Chronic diastolic (congestive) heart failure.  Plan: Patient has preserved ejection fraction.     Attending Attestation:   Transfer to rehab.     I was physically present for the key portions of the evaluation and management (E/M) service provided.  I agree with the above history, physical, and plan which I have reviewed and edited where appropriate.     45 minutes spent on total encounter; more than 50% of the visit was spent counseling and/or coordinating care by the attending physician.

## 2018-03-21 NOTE — ED ADULT TRIAGE NOTE - CHIEF COMPLAINT QUOTE
Patient brought in by ambulance A/Ox3, reports weakness in both legs, fell this morning, reports small bump to back of head.

## 2018-03-21 NOTE — ED PROVIDER NOTE - PROGRESS NOTE DETAILS
when pt returned from ct scan she stated that she had more weakness to the right leg and was unable to stand. neuro was called and pt is to be admitted and have an MRI

## 2018-03-21 NOTE — ED ADULT NURSE NOTE - OBJECTIVE STATEMENT
PT BIBA c/o weakness bilaterally to lower extremities, Pt states she tried to get out bed this morning but legs felt weak and then she was able to get out of bed however states she had to crawl across the floor to open the door for her neighbor and neighbor called 911,  Pt states she feels numbness to her legs and she can't stand up. Pt states she ambulates around the house by holding the wall or using a cane, pt states she has a walker but doesn't use it as much. Pt states she is unable to move her legs however when asked to put knees together pt able to and pt able to move feet up and down. PT has strong hand . Pt is A & OX4, respirations are even & unlabored. Pt offers no other complaints.

## 2018-03-21 NOTE — CONSULT NOTE ADULT - PROBLEM SELECTOR PROBLEM 1
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

## 2018-03-21 NOTE — DISCHARGE NOTE ADULT - PLAN OF CARE
Prevent further strokes Add aspirin and statin. Patient scheduled for transfer to rehab on 3/24. As per echocardiogram Follow up with cardiology as an outpatient. She is currently stable on her present regimen. BP stable on current regimen I would attempt to discontinue protonix to reduce pill burden but she may need to resume antacid if she becomes symptomatic.

## 2018-03-21 NOTE — DISCHARGE NOTE ADULT - MEDICATION SUMMARY - MEDICATIONS TO TAKE
I will START or STAY ON the medications listed below when I get home from the hospital:    aspirin 81 mg oral tablet, chewable  -- 1 tab(s) by mouth once a day  -- Indication: For Cerebrovascular accident (CVA), unspecified mechanism    atorvastatin 40 mg oral tablet  -- 1 tab(s) by mouth once a day (at bedtime)  -- Indication: For Cerebrovascular accident (CVA), unspecified mechanism    calcium (as carbonate)-vitamin D 250 mg-125 intl units oral tablet  -- 1 tab(s) by mouth 2 times a day  -- Indication: For Osteopenia    cholecalciferol oral tablet  -- 2000 unit(s) by mouth once a day  -- Indication: For Vitamin D Deficiency

## 2018-03-21 NOTE — DISCHARGE NOTE ADULT - MEDICATION SUMMARY - MEDICATIONS TO STOP TAKING
I will STOP taking the medications listed below when I get home from the hospital:    Zantac 15 mg/mL oral syrup  -- 75 mg by mouth 2 times a day    guaiFENesin 100 mg/5 mL oral liquid  -- 10 milliliter(s) by mouth every 6 hours, As needed, Cough    albuterol 2.5 mg/3 mL (0.083%) inhalation solution  -- 1 dose(s) inhaled every 6 hours, As Needed    amLODIPine 10 mg oral tablet  -- 1 tab(s) by mouth once a day    Levaquin 500 mg oral tablet  -- 1 tab(s) by mouth every 24 hours x 3 days    Florastor 250 mg oral capsule  -- 1 cap(s) by mouth 2 times a day

## 2018-03-21 NOTE — H&P ADULT - HISTORY OF PRESENT ILLNESS
89 year old female lives alone no Primary medical doctor fell when trying to rise out of bed. She struck her head and called a neighbor who helped her get to the emergency room. In the er she has profound right lower extremity weakness suspicious for cva. She was seen by neurology.

## 2018-03-21 NOTE — PHYSICAL THERAPY INITIAL EVALUATION ADULT - ADDITIONAL COMMENTS
Pt lives on the 1st floor of a 2 story house, with 4 steps to enter.  Modified Independent with all ADLs and self care PTA, without devices.

## 2018-03-21 NOTE — PROVIDER CONTACT NOTE (OTHER) - ACTION/TREATMENT ORDERED:
PT Goals( to achieve in 2 weeks);Pt independent with bed mobility. Pt min. assist with transfers.  Pt min. assist with amb with RW X 50 feet

## 2018-03-21 NOTE — H&P ADULT - PROBLEM SELECTOR PLAN 1
Admit to stroke unit, aspirin, statin, mri, echo  PT eval. Patient is extremely high risk as she has flaccid paralysis of right lower extremity.

## 2018-03-22 LAB
24R-OH-CALCIDIOL SERPL-MCNC: 20.1 NG/ML — LOW (ref 30–80)
ANION GAP SERPL CALC-SCNC: 11 MMOL/L — SIGNIFICANT CHANGE UP (ref 5–17)
BUN SERPL-MCNC: 6 MG/DL — LOW (ref 8–20)
CALCIUM SERPL-MCNC: 8.5 MG/DL — LOW (ref 8.6–10.2)
CHLORIDE SERPL-SCNC: 97 MMOL/L — LOW (ref 98–107)
CHOLEST SERPL-MCNC: 172 MG/DL — SIGNIFICANT CHANGE UP (ref 110–199)
CO2 SERPL-SCNC: 27 MMOL/L — SIGNIFICANT CHANGE UP (ref 22–29)
CREAT SERPL-MCNC: 0.43 MG/DL — LOW (ref 0.5–1.3)
CULTURE RESULTS: SIGNIFICANT CHANGE UP
GLUCOSE SERPL-MCNC: 98 MG/DL — SIGNIFICANT CHANGE UP (ref 70–115)
HCT VFR BLD CALC: 39.3 % — SIGNIFICANT CHANGE UP (ref 37–47)
HDLC SERPL-MCNC: 116 MG/DL — SIGNIFICANT CHANGE UP
HGB BLD-MCNC: 13.2 G/DL — SIGNIFICANT CHANGE UP (ref 12–16)
LIPID PNL WITH DIRECT LDL SERPL: 47 MG/DL — SIGNIFICANT CHANGE UP
MAGNESIUM SERPL-MCNC: 2 MG/DL — SIGNIFICANT CHANGE UP (ref 1.6–2.6)
MCHC RBC-ENTMCNC: 32.5 PG — HIGH (ref 27–31)
MCHC RBC-ENTMCNC: 33.6 G/DL — SIGNIFICANT CHANGE UP (ref 32–36)
MCV RBC AUTO: 96.8 FL — SIGNIFICANT CHANGE UP (ref 81–99)
PHOSPHATE SERPL-MCNC: 3.1 MG/DL — SIGNIFICANT CHANGE UP (ref 2.4–4.7)
PLATELET # BLD AUTO: 221 K/UL — SIGNIFICANT CHANGE UP (ref 150–400)
POTASSIUM SERPL-MCNC: 3.8 MMOL/L — SIGNIFICANT CHANGE UP (ref 3.5–5.3)
POTASSIUM SERPL-SCNC: 3.8 MMOL/L — SIGNIFICANT CHANGE UP (ref 3.5–5.3)
RBC # BLD: 4.06 M/UL — LOW (ref 4.4–5.2)
RBC # FLD: 13.6 % — SIGNIFICANT CHANGE UP (ref 11–15.6)
SODIUM SERPL-SCNC: 135 MMOL/L — SIGNIFICANT CHANGE UP (ref 135–145)
SPECIMEN SOURCE: SIGNIFICANT CHANGE UP
TOTAL CHOLESTEROL/HDL RATIO MEASUREMENT: 1 RATIO — LOW (ref 3.3–7.1)
TRIGL SERPL-MCNC: 45 MG/DL — SIGNIFICANT CHANGE UP (ref 10–200)
WBC # BLD: 4.6 K/UL — LOW (ref 4.8–10.8)
WBC # FLD AUTO: 4.6 K/UL — LOW (ref 4.8–10.8)

## 2018-03-22 PROCEDURE — 70551 MRI BRAIN STEM W/O DYE: CPT | Mod: 26

## 2018-03-22 PROCEDURE — 99233 SBSQ HOSP IP/OBS HIGH 50: CPT

## 2018-03-22 PROCEDURE — 93880 EXTRACRANIAL BILAT STUDY: CPT | Mod: 26

## 2018-03-22 PROCEDURE — 99222 1ST HOSP IP/OBS MODERATE 55: CPT | Mod: GC

## 2018-03-22 PROCEDURE — 72148 MRI LUMBAR SPINE W/O DYE: CPT | Mod: 26

## 2018-03-22 PROCEDURE — 72146 MRI CHEST SPINE W/O DYE: CPT | Mod: 26

## 2018-03-22 RX ORDER — POLYETHYLENE GLYCOL 3350 17 G/17G
17 POWDER, FOR SOLUTION ORAL DAILY
Qty: 0 | Refills: 0 | Status: DISCONTINUED | OUTPATIENT
Start: 2018-03-22 | End: 2018-03-24

## 2018-03-22 RX ORDER — SENNA PLUS 8.6 MG/1
2 TABLET ORAL AT BEDTIME
Qty: 0 | Refills: 0 | Status: DISCONTINUED | OUTPATIENT
Start: 2018-03-22 | End: 2018-03-24

## 2018-03-22 RX ORDER — DOCUSATE SODIUM 100 MG
100 CAPSULE ORAL
Qty: 0 | Refills: 0 | Status: DISCONTINUED | OUTPATIENT
Start: 2018-03-22 | End: 2018-03-24

## 2018-03-22 RX ADMIN — ATORVASTATIN CALCIUM 40 MILLIGRAM(S): 80 TABLET, FILM COATED ORAL at 21:22

## 2018-03-22 RX ADMIN — Medication 81 MILLIGRAM(S): at 15:37

## 2018-03-22 RX ADMIN — Medication 100 MILLIGRAM(S): at 17:44

## 2018-03-22 RX ADMIN — SENNA PLUS 2 TABLET(S): 8.6 TABLET ORAL at 21:21

## 2018-03-22 RX ADMIN — PANTOPRAZOLE SODIUM 40 MILLIGRAM(S): 20 TABLET, DELAYED RELEASE ORAL at 05:45

## 2018-03-22 NOTE — PROGRESS NOTE ADULT - PROBLEM SELECTOR PLAN 1
c/w aspirin, statin  CT head negative, mri positive for small, acute infarct posterior left frontal white matter in the subcortical region, echo performed, not yet resulted, carotid duplex showed mild plague no hemodynamic abnormality  PT eval rec acute rehab  Patient is extremely high risk as she has flaccid paralysis of right lower extremity.

## 2018-03-22 NOTE — PROGRESS NOTE ADULT - SUBJECTIVE AND OBJECTIVE BOX
Resident Progress Note  Patient is a 89y old  Female who presents with a chief complaint of Fall (21 Mar 2018 19:47)    INTERVAL/OVERNIGHT EVENTS: Patient seen and examined earlier in day. No acute overnight events. Patient states " I just need an xray of my right knee".    ROS: She denies chest pain, palpitations, sob, light headedness/dizziness difficulty breathing/cough, fevers/chills, abdominal pain, n/v, dysuria or increased urinary frequency    Vital Signs Last 24 Hrs  T(C): 36.7 (22 Mar 2018 09:41), Max: 37 (21 Mar 2018 19:57)  T(F): 98.1 (22 Mar 2018 09:41), Max: 98.6 (21 Mar 2018 19:57)  HR: 78 (22 Mar 2018 08:33) (72 - 93)  BP: 150/68 (22 Mar 2018 08:33) (145/70 - 163/85)  BP(mean): 91 (22 Mar 2018 08:33) (90 - 100)  RR: 20 (22 Mar 2018 08:33) (13 - 20)  SpO2: 97% (22 Mar 2018 08:33) (97% - 98%)    21 Mar 2018 07:01  -  22 Mar 2018 07:00  --------------------------------------------------------  IN: 240 mL / OUT: 0 mL / NET: 240 mL    PHYSICAL EXAM:  General: Frail elderly female in NAD  HEENT: NC AT EOMI, moist mucous membranes.  Neck: Supple  Respiratory: Clear to auscultation bilaterally, no wheezing, rales, or rhonchi.  Cardiovascular: RRR, +murmurs, rubs, or gallops.  Gastrointestinal: BS+, soft, non-tender, no masses palpable  Extremities: No peripheral edema.  Vascular: 2+ peripheral pulses.  Neurological: A/O x 3, RLE weakness strength 0/5.  Psychiatric: Normal mood, normal affect.  Skin: No rashes.    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 650 milliGRAM(s) Oral every 6 hours PRN Mild Pain (1 - 3)  bisacodyl Suppository 10 milliGRAM(s) Rectal daily PRN Constipation  polyethylene glycol 3350 17 Gram(s) Oral daily PRN Constipation      LABS:                        13.2   4.6   )-----------( 221      ( 22 Mar 2018 08:45 )             39.3     -    135  |  97<L>  |  6.0<L>  ----------------------------<  98  3.8   |  27.0  |  0.43<L>    Ca    8.5<L>      22 Mar 2018 08:44  Phos  3.1       Mg     2.0         TPro  7.6  /  Alb  4.2  /  TBili  0.4  /  DBili  x   /  AST  35<H>  /  ALT  14  /  AlkPhos  73  03-21    PT/INR - ( 21 Mar 2018 10:12 )   PT: 12.7 sec;   INR: 1.15 ratio       PTT - ( 21 Mar 2018 10:12 )  PTT:29.7 sec  Urinalysis Basic - ( 21 Mar 2018 11:09 )    Color: Yellow / Appearance: Clear / S.010 / pH: x  Gluc: x / Ketone: Negative  / Bili: Negative / Urobili: Negative mg/dL   Blood: x / Protein: 15 mg/dL / Nitrite: Negative   Leuk Esterase: Trace / RBC: x / WBC 0-2   Sq Epi: x / Non Sq Epi: Occasional / Bacteria: x    RADIOLOGY & ADDITIONAL TESTS:    < from: MR Head No Cont (18 @ 13:04) >  IMPRESSION:      1)  small, acute infarct posterior left frontal white matter in the   subcortical region..  2)  scattered chronic ischemic changes in both hemispheres with volume   loss..     < end of copied text >    < from: US Duplex Carotid Arteries Complete, Bilateral (18 @ 13:24) >  IMPRESSION: Mild plaque without a hemodynamic abnormality.    < end of copied text >

## 2018-03-22 NOTE — SWALLOW BEDSIDE ASSESSMENT ADULT - PHARYNGEAL PHASE
Medical attending follow up:    Patient is a 65y old  Male who presents with a chief complaint of LLQ pain (12 Sep 2017 22:05)      INTERVAL HPI/OVERNIGHT EVENTS: Patient seen and examined at bedside with no new complaints, slight unsteadiness with ambulating noted w/ PT evalulation, pending psychiatric clearence for medical capacity - patient admitted to being homeless     MEDICATIONS  (STANDING):  sodium chloride 0.9%. 1000 milliLiter(s) (125 mL/Hr) IV Continuous <Continuous>  heparin  Injectable 5000 Unit(s) SubCutaneous every 8 hours  ergocalciferol 23375 Unit(s) Oral <User Schedule>    Vital Signs Last 24 Hrs  T(C): 36.4 (16 Sep 2017 14:51), Max: 36.5 (15 Sep 2017 21:35)  T(F): 97.6 (16 Sep 2017 14:51), Max: 97.7 (15 Sep 2017 21:35)  HR: 73 (16 Sep 2017 14:51) (62 - 73)  BP: 151/77 (16 Sep 2017 14:51) (147/86 - 154/75)  BP(mean): --  RR: 18 (16 Sep 2017 14:51) (18 - 18)  SpO2: 98% (16 Sep 2017 14:51) (98% - 99%)      __________________________________________________  REVIEW OF SYSTEMS:    CONSTITUTIONAL: No fever,   EYES: No acute visual disturbances  NECK: No pain or stiffness  RESPIRATORY: No cough; No shortness of breath  CARDIOVASCULAR: No chest pain, no palpitations  GASTROINTESTINAL: No pain. No nausea or vomiting; No diarrhea   NEUROLOGICAL: No headache or numbness, no tremors  MUSCULOSKELETAL: No joint pain, no muscle pain  GENITOURINARY: No dysuria, no frequency, no hesitancy  PSYCHIATRY: No depression , no anxiety  ALL OTHER  ROS negative        ________________________________________________  PHYSICAL EXAM:  GENERAL: NAD  HEENT: Normocephalic;  conjunctivae and sclerae clear; moist mucous membranes;   NECK : supple  CHEST/LUNG: Clear to auscultation bilaterally with good air entry   HEART: S1 S2  regular; no murmurs, gallops or rubs  ABDOMEN: Soft, Nontender, Nondistended; Bowel sounds present  EXTREMITIES: No cyanosis; no edema; no calf tenderness  NERVOUS SYSTEM:  Awake and alert; Oriented to person; no new deficits    _________________________________________________  LABS:                 Consultant(s) Notes Reviewed:   YES    Care Discussed with Consultants : YES    Plan of care was discussed with patient and /or primary care giver; all questions and concerns were addressed and care was aligned with patient's wishes. Within functional limits

## 2018-03-22 NOTE — CONSULT NOTE ADULT - ATTENDING COMMENTS
89 year old female with h/o HTN admitted with fall and Right LE weakness. workup - MRI showed a small acute left frontal lobe infarct.  w/u on going for etiology of stroke    Patient needed mod assist with PT and OT.    Patient perseverating about being constipated and unable to cooperate for exam    In the setting of acute stroke and patient being previously independent, would recommend acute rehab program of PT/OT and/ or ST 3 hrs/day 5-6 days/week, physician oversight in view of recent stroke and 24 hr nursing  will follow.  Thank you.

## 2018-03-22 NOTE — PROGRESS NOTE ADULT - ASSESSMENT
89 year old female with pmh of GERD, admitted for CVA, CT negative, MRI noted to be positive for acute stroke

## 2018-03-22 NOTE — SWALLOW BEDSIDE ASSESSMENT ADULT - COMMENTS
89 year old female lives alone no Primary medical doctor fell when trying to rise out of bed. She struck her head and called a neighbor who helped her get to the emergency room. CT Head 3/21/18: negative for CVA

## 2018-03-22 NOTE — PROGRESS NOTE ADULT - SUBJECTIVE AND OBJECTIVE BOX
Interval History:  no change  MEDICATIONS  (STANDING):  aspirin  chewable 81 milliGRAM(s) Oral daily  atorvastatin 40 milliGRAM(s) Oral at bedtime  calcium carbonate  625 mG + Vitamin D (OsCal 250 + D) 1 Tablet(s) Oral two times a day  cholecalciferol 1000 Unit(s) Oral daily  enoxaparin Injectable 40 milliGRAM(s) SubCutaneous daily  pantoprazole    Tablet 40 milliGRAM(s) Oral before breakfast    MEDICATIONS  (PRN):  acetaminophen   Tablet. 650 milliGRAM(s) Oral every 6 hours PRN Mild Pain (1 - 3)      Allergies    codeine (Unknown)  Medrol (Unknown)  morphine (Unknown)  opium (Unknown)  predniSONE (Unknown)    Intolerances        PHYSICAL EXAM:  Vital Signs Last 24 Hrs  T(F): 98.1 (18 @ 09:41)  HR: 78 (18 @ 08:33)  BP: 150/68 (18 @ 08:33)  RR: 20 (18 @ 08:33)    GENERAL: NAD, well-groomed, well-developed  HEAD:  Atraumatic, Normocephalic  EYES: EOMI, PERRLA, conjunctiva and sclera clear  NECK: Supple, No JVD, Normal thyroid, no carotid bruit bilateral  NERVOUS SYSTEM:  Alert & Oriented X3, speech and language normal, cranial nerves II-XII normal, Motor Strength 5/5 B/L upper , right LE 1-2/5, left LE 5-5/,  DTRs absent  plantar responses extensor right, flexor left.   HEART: Regular rate and rhythm; No murmurs, rubs, or gallops    LABS:                        13.2   4.6   )-----------( 221      ( 22 Mar 2018 08:45 )             39.3         135  |  97<L>  |  6.0<L>  ----------------------------<  98  3.8   |  27.0  |  0.43<L>    Ca    8.5<L>      22 Mar 2018 08:44  Phos  3.1       Mg     2.0         TPro  7.6  /  Alb  4.2  /  TBili  0.4  /  DBili  x   /  AST  35<H>  /  ALT  14  /  AlkPhos  73      PT/INR - ( 21 Mar 2018 10:12 )   PT: 12.7 sec;   INR: 1.15 ratio         PTT - ( 21 Mar 2018 10:12 )  PTT:29.7 sec  Urinalysis Basic - ( 21 Mar 2018 11:09 )    Color: Yellow / Appearance: Clear / S.010 / pH: x  Gluc: x / Ketone: Negative  / Bili: Negative / Urobili: Negative mg/dL   Blood: x / Protein: 15 mg/dL / Nitrite: Negative   Leuk Esterase: Trace / RBC: x / WBC 0-2   Sq Epi: x / Non Sq Epi: Occasional / Bacteria: x        RADIOLOGY & ADDITIONAL STUDIES:

## 2018-03-22 NOTE — CONSULT NOTE ADULT - SUBJECTIVE AND OBJECTIVE BOX
Patient is a 89y old  Female who presents with a chief complaint of Fall (21 Mar 2018 19:47)      HPI:  89 year old female lives alone no Primary medical doctor fell when trying to rise out of bed. She struck her head and called a neighbor who helped her get to the emergency room. In the er she has profound right lower extremity weakness suspicious for cva. She was seen by neurology. (21 Mar 2018 15:06)      PCP:     PT/OT EVALUATION:  BED MOBILITY:   TRANSFERS:   GAIT:   ADLS:     PAST MEDICAL & SURGICAL HISTORY:  Hypertension  Hiatal hernia  S/P appendectomy  H/O oophorectomy  History of tonsillectomy      FAMILY HISTORY:  No pertinent family history in first degree relatives      SOCIAL HISTORY:  TOBACCO: denies history  ALCOHOL: denies abuse  IVDA: denies history    FUNCTIONAL, ENVIRONMENTAL HISTORY:  WORK HISTORY:   LIVES WITH:   HOME LAYOUT:   STAIRS TO ENTER:   STAIRS INSIDE:   FUNCTIONAL HISTORY: independent with ambulation and ADLs    Allergies    codeine (Unknown)  Medrol (Unknown)  morphine (Unknown)  opium (Unknown)  predniSONE (Unknown)    Intolerances        MEDICATIONS  (STANDING):  aspirin  chewable 81 milliGRAM(s) Oral daily  atorvastatin 40 milliGRAM(s) Oral at bedtime  calcium carbonate  625 mG + Vitamin D (OsCal 250 + D) 1 Tablet(s) Oral two times a day  cholecalciferol 1000 Unit(s) Oral daily  enoxaparin Injectable 40 milliGRAM(s) SubCutaneous daily  pantoprazole    Tablet 40 milliGRAM(s) Oral before breakfast    MEDICATIONS  (PRN):  acetaminophen   Tablet. 650 milliGRAM(s) Oral every 6 hours PRN Mild Pain (1 - 3)      REVIEW OF SYSTEMS:    CONSTITUTIONAL: No fever  EYES: No visual disturbances  ENMT:  No difficulty hearing, No throat pain  RESPIRATORY: No cough, No shortness of breath  CARDIOVASCULAR: No chest pain, No palpitations  GASTROINTESTINAL: No abdominal pain, No nausea, No vomiting, No diarrhea, No constipation  GENITOURINARY: No dysuria, No frequency, No incontinence  NEUROLOGICAL: No headaches, No dizziness, No loss of strength, No numbness  SKIN: No itching, No rashes  MUSCULOSKELETAL: No joint/muscle pain; No back pain  PSYCHIATRIC: No depression, No anxiety    Vital Signs Last 24 Hrs  T(C): 36.7 (22 Mar 2018 09:41), Max: 37 (21 Mar 2018 19:57)  T(F): 98.1 (22 Mar 2018 09:41), Max: 98.6 (21 Mar 2018 19:57)  HR: 78 (22 Mar 2018 08:33) (72 - 93)  BP: 150/68 (22 Mar 2018 08:33) (145/70 - 163/85)  BP(mean): 91 (22 Mar 2018 08:33) (90 - 100)  RR: 20 (22 Mar 2018 08:33) (13 - 20)  SpO2: 97% (22 Mar 2018 08:33) (97% - 98%)    PHYSICAL EXAM:    GENERAL: NAD, well-groomed, well-developed  HEENT:  Atraumatic, normocephalic, conjunctiva clear, moist mucous membranes  HEART: Regular rate and rhythm, No murmurs  CHEST/LUNG: Clear to auscultation bilaterally, normal respiratory effort  ABDOMEN: Soft, Nontender, Nondistended, Bowel sounds present  EXTREMITIES:  2+ Peripheral Pulses, No edema  SKIN: No rashes or lesions  NERVOUS SYSTEM:  Alert & Oriented X3, speech intact  CNs II-XII grossly intact  Motor Strength 5/5 B/L upper and lower extremities  Sensation intact to light touch symmetrically  DTRs 2+ intact and symmetric  FUNCTIONAL EXAM:      LABS:                        13.2   4.6   )-----------( 221      ( 22 Mar 2018 08:45 )             39.3     03-22    135  |  97<L>  |  6.0<L>  ----------------------------<  98  3.8   |  27.0  |  0.43<L>    Ca    8.5<L>      22 Mar 2018 08:44  Phos  3.1     -  Mg     2.0     -22    TPro  7.6  /  Alb  4.2  /  TBili  0.4  /  DBili  x   /  AST  35<H>  /  ALT  14  /  AlkPhos  73  03-21    PT/INR - ( 21 Mar 2018 10:12 )   PT: 12.7 sec;   INR: 1.15 ratio         PTT - ( 21 Mar 2018 10:12 )  PTT:29.7 sec  Urinalysis Basic - ( 21 Mar 2018 11:09 )    Color: Yellow / Appearance: Clear / S.010 / pH: x  Gluc: x / Ketone: Negative  / Bili: Negative / Urobili: Negative mg/dL   Blood: x / Protein: 15 mg/dL / Nitrite: Negative   Leuk Esterase: Trace / RBC: x / WBC 0-2   Sq Epi: x / Non Sq Epi: Occasional / Bacteria: x        RADIOLOGY & ADDITIONAL STUDIES:  3/21/18 CT Head IMPRESSION:  Severe chronic microvascular changes without evidence of an acute transcortical infarction or hemorrhage.    3/21/18 CT C-Spine Impression:  Degenerative disc disease and spondylosis. No fracture. Generalized osteopenia compatible with advanced age.        ASSESSMENT & PLAN:  89F with   ~Medical management/workup as per primary team  ~ Patient is a 89y old  Female who presents with a chief complaint of Fall (21 Mar 2018 19:47)      HPI:    89 yo RHD woman w/PMHx of HTN and hiatal hernia with GERD who presented to [facility] ED on 3/21/18 after falling when trying to get out of bed due to right LE weakness. Denies head trauma/LOC. No syncope, dizziness, or lightheadedness. Admitted for r/o CVA versus spinal stenosis. CT Head and CT C-Spine negative for acute pathology. MRI Star 3/22/18 revealed acute infarct of the posterior left frontal region. MRI L-Spine showed no acute pathology. TTE and Bilateral Carotid Duplex are pending.    PCP: no PMD    PT/OT EVALUATION:  BED MOBILITY: min assist  TRANSFERS: min/mod assist  GAIT: 3 steps RW mod assist  ADLS: LB dress mod assist    PAST MEDICAL & SURGICAL HISTORY:  Hypertension  Hiatal hernia with GERD  S/P appendectomy  H/O oophorectomy  History of tonsillectomy      FAMILY HISTORY:  No pertinent family history in first degree relatives      SOCIAL HISTORY:  TOBACCO: denies history  ALCOHOL: denies abuse  IVDA: denies history    FUNCTIONAL, ENVIRONMENTAL HISTORY:  WORK HISTORY: retired  LIVES WITH: alone, no family nearby, neighbors are helpful and drive her to places & help with grocery shopping  HOME LAYOUT: 2-story private home (but does not use the upstairs portion)  STAIRS TO ENTER: 4  STAIRS INSIDE: 1 flight but does not need to use  FUNCTIONAL HISTORY: independent without AD for ambulation, transfers, and ADLs; does not drive    Allergies    codeine (Unknown)  Medrol (Unknown)  morphine (Unknown)  opium (Unknown)  predniSONE (Unknown)    Intolerances        MEDICATIONS  (STANDING):  aspirin  chewable 81 milliGRAM(s) Oral daily  atorvastatin 40 milliGRAM(s) Oral at bedtime  calcium carbonate  625 mG + Vitamin D (OsCal 250 + D) 1 Tablet(s) Oral two times a day  cholecalciferol 1000 Unit(s) Oral daily  enoxaparin Injectable 40 milliGRAM(s) SubCutaneous daily  pantoprazole    Tablet 40 milliGRAM(s) Oral before breakfast    MEDICATIONS  (PRN):  acetaminophen   Tablet. 650 milliGRAM(s) Oral every 6 hours PRN Mild Pain (1 - 3)      REVIEW OF SYSTEMS:    CONSTITUTIONAL: No fever  EYES: No visual disturbances  ENMT:  No difficulty hearing, No throat pain  RESPIRATORY: No cough, No shortness of breath  CARDIOVASCULAR: No chest pain, No palpitations  GASTROINTESTINAL: No abdominal pain, No nausea, No vomiting, No diarrhea, No constipation  GENITOURINARY: No dysuria, No frequency, No incontinence  NEUROLOGICAL: +Right leg and foot weakness, +RLE numbness below the knee, No headaches, No dizziness  SKIN: No itching, No rashes  MUSCULOSKELETAL: No joint/muscle pain; No back pain  PSYCHIATRIC: No depression, No anxiety    Vital Signs Last 24 Hrs  T(C): 36.7 (22 Mar 2018 09:41), Max: 37 (21 Mar 2018 19:57)  T(F): 98.1 (22 Mar 2018 09:41), Max: 98.6 (21 Mar 2018 19:57)  HR: 78 (22 Mar 2018 08:33) (72 - 93)  BP: 150/68 (22 Mar 2018 08:33) (145/70 - 163/85)  BP(mean): 91 (22 Mar 2018 08:33) (90 - 100)  RR: 20 (22 Mar 2018 08:33) (13 - 20)  SpO2: 97% (22 Mar 2018 08:33) (97% - 98%)    PHYSICAL EXAM:    GENERAL: NAD, well-groomed, well-developed  HEENT:  Atraumatic, normocephalic  HEART: S1/S2, warm, well perfused  CHEST/LUNG: Normal respiratory effort  ABDOMEN: Soft, Nontender  EXTREMITIES:  2+ Peripheral Pulses, No edema  SKIN: No rashes or lesions  NERVOUS SYSTEM:  Alert & Oriented X3, speech & swallow intact  CNs II-XII grossly intact  Motor Strength 5/5 B/L upper extremities and left lower extremity  Right LE motor strength: hip flexion 2/5, knee ext 2/5, plantar flexion 2/5, dorsidlexion 0/5, EHL 0/5  Light Touch Sensation diminished on right LE below knee  DTRs 2+ intact and symmetric    LABS:                        13.2   4.6   )-----------( 221      ( 22 Mar 2018 08:45 )             39.3     03-    135  |  97<L>  |  6.0<L>  ----------------------------<  98  3.8   |  27.0  |  0.43<L>    Ca    8.5<L>      22 Mar 2018 08:44  Phos  3.1     -  Mg     2.0         TPro  7.6  /  Alb  4.2  /  TBili  0.4  /  DBili  x   /  AST  35<H>  /  ALT  14  /  AlkPhos  73  -    PT/INR - ( 21 Mar 2018 10:12 )   PT: 12.7 sec;   INR: 1.15 ratio         PTT - ( 21 Mar 2018 10:12 )  PTT:29.7 sec  Urinalysis Basic - ( 21 Mar 2018 11:09 )    Color: Yellow / Appearance: Clear / S.010 / pH: x  Gluc: x / Ketone: Negative  / Bili: Negative / Urobili: Negative mg/dL   Blood: x / Protein: 15 mg/dL / Nitrite: Negative   Leuk Esterase: Trace / RBC: x / WBC 0-2   Sq Epi: x / Non Sq Epi: Occasional / Bacteria: x        RADIOLOGY & ADDITIONAL STUDIES:  3/22/18 MRI L-Spine IMPRESSION:   1. Thoracolumbar scoliosis. Multilevel degenerative changes.   2. No acute fracture identified. Mild chronic compression deformity of T11.   3. Multilevel disc disease with the hypertrophic changes of posterior elements. However there is no tight stenosis or intradural abnormality.    3/22/18 MRI Brain IMPRESSION:  1) small, acute infarct posterior left frontal white matter in the subcortical region.  2) scattered chronic ischemic changes in both hemispheres with volume loss.    3/21/18 CT Head IMPRESSION:  Severe chronic microvascular changes without evidence of an acute transcortical infarction or hemorrhage.    3/21/18 CT C-Spine Impression:  Degenerative disc disease and spondylosis. No fracture. Generalized osteopenia compatible with advanced age.        ASSESSMENT & PLAN:  89F with right  LE weakness and functional deficits due to acute posterior left frontal infarct.  ~Medical management/workup as per primary team  ~Rehab: When medically stable, recommend acute rehab. Patient would benefit from and can tolerate 3 hours of therapy/day. Patient is a 89y old  Female who presents with a chief complaint of Fall (21 Mar 2018 19:47)      HPI:    89 yo RHD woman w/PMHx of HTN and hiatal hernia with GERD who presented to [facility] ED on 3/21/18 after falling when trying to get out of bed due to right LE weakness. Reports +head trauma but no LOC. No syncope, dizziness, or lightheadedness. Admitted for r/o CVA versus spinal stenosis. CT Head and CT C-Spine negative for acute pathology. MRI Star 3/22/18 revealed acute infarct of the posterior left frontal region. MRI L-Spine showed no acute pathology. TTE and Bilateral Carotid Duplex are pending.    PCP: no PMD    PT/OT EVALUATION:  BED MOBILITY: min assist  TRANSFERS: min/mod assist  GAIT: 3 steps RW mod assist  ADLS: LB dress mod assist    PAST MEDICAL & SURGICAL HISTORY:  Hypertension  Hiatal hernia with GERD  S/P appendectomy  H/O oophorectomy  History of tonsillectomy      FAMILY HISTORY:  No pertinent family history in first degree relatives      SOCIAL HISTORY:  TOBACCO: denies history  ALCOHOL: denies abuse  IVDA: denies history    FUNCTIONAL, ENVIRONMENTAL HISTORY:  WORK HISTORY: retired  LIVES WITH: alone, no family nearby (closest is daughter in her 70s who lives in [place] but has no car), neighbors are helpful and drive her to places & help with grocery shopping  HOME LAYOUT: 2-story private home (but does not use the upstairs portion)  STAIRS TO ENTER: 4  STAIRS INSIDE: 1 flight but does not need to use  FUNCTIONAL HISTORY: independent without AD for ambulation, transfers, and ADLs; does not drive    Allergies    codeine (Unknown)  Medrol (Unknown)  morphine (Unknown)  opium (Unknown)  predniSONE (Unknown)    Intolerances        MEDICATIONS  (STANDING):  aspirin  chewable 81 milliGRAM(s) Oral daily  atorvastatin 40 milliGRAM(s) Oral at bedtime  calcium carbonate  625 mG + Vitamin D (OsCal 250 + D) 1 Tablet(s) Oral two times a day  cholecalciferol 1000 Unit(s) Oral daily  enoxaparin Injectable 40 milliGRAM(s) SubCutaneous daily  pantoprazole    Tablet 40 milliGRAM(s) Oral before breakfast    MEDICATIONS  (PRN):  acetaminophen   Tablet. 650 milliGRAM(s) Oral every 6 hours PRN Mild Pain (1 - 3)      REVIEW OF SYSTEMS:    CONSTITUTIONAL: No fever  EYES: No visual disturbances  ENMT:  No difficulty hearing, No throat pain  RESPIRATORY: No cough, No shortness of breath  CARDIOVASCULAR: No chest pain, No palpitations  GASTROINTESTINAL: +Constipation, No abdominal pain, No nausea, No vomiting, No diarrhea  GENITOURINARY: No dysuria, No frequency, No incontinence  NEUROLOGICAL: +Right leg and foot weakness, +RLE numbness below the knee, No headaches, No dizziness  SKIN: No itching, No rashes  MUSCULOSKELETAL: No joint/muscle pain; No back pain  PSYCHIATRIC: No depression, No anxiety    Vital Signs Last 24 Hrs  T(C): 36.7 (22 Mar 2018 09:41), Max: 37 (21 Mar 2018 19:57)  T(F): 98.1 (22 Mar 2018 09:41), Max: 98.6 (21 Mar 2018 19:57)  HR: 78 (22 Mar 2018 08:33) (72 - 93)  BP: 150/68 (22 Mar 2018 08:33) (145/70 - 163/85)  BP(mean): 91 (22 Mar 2018 08:33) (90 - 100)  RR: 20 (22 Mar 2018 08:33) (13 - 20)  SpO2: 97% (22 Mar 2018 08:33) (97% - 98%)    PHYSICAL EXAM:    GENERAL: NAD, well-groomed, well-developed  HEENT:  Atraumatic, normocephalic  HEART: S1/S2, warm, well perfused  CHEST/LUNG: Normal respiratory effort  ABDOMEN: Soft, Nontender, distended, +active bowel sounds  EXTREMITIES:  2+ Peripheral Pulses, No edema  SKIN: No rashes or lesions  NERVOUS SYSTEM:  Alert & Oriented X3, speech & swallow intact  CNs II-XII grossly intact  Motor Strength 5/5 B/L upper extremities and left lower extremity  Right LE motor strength: hip flexion 2/5, knee ext 2/5, plantar flexion 2/5, dorsidlexion 0/5, EHL 0/5  Light Touch Sensation diminished on right LE below knee  DTRs 2+ intact and symmetric    LABS:                        13.2   4.6   )-----------( 221      ( 22 Mar 2018 08:45 )             39.3     03-22    135  |  97<L>  |  6.0<L>  ----------------------------<  98  3.8   |  27.0  |  0.43<L>    Ca    8.5<L>      22 Mar 2018 08:44  Phos  3.1     -  Mg     2.0         TPro  7.6  /  Alb  4.2  /  TBili  0.4  /  DBili  x   /  AST  35<H>  /  ALT  14  /  AlkPhos  73      PT/INR - ( 21 Mar 2018 10:12 )   PT: 12.7 sec;   INR: 1.15 ratio         PTT - ( 21 Mar 2018 10:12 )  PTT:29.7 sec  Urinalysis Basic - ( 21 Mar 2018 11:09 )    Color: Yellow / Appearance: Clear / S.010 / pH: x  Gluc: x / Ketone: Negative  / Bili: Negative / Urobili: Negative mg/dL   Blood: x / Protein: 15 mg/dL / Nitrite: Negative   Leuk Esterase: Trace / RBC: x / WBC 0-2   Sq Epi: x / Non Sq Epi: Occasional / Bacteria: x        RADIOLOGY & ADDITIONAL STUDIES:  3/22/18 MRI L-Spine IMPRESSION:   1. Thoracolumbar scoliosis. Multilevel degenerative changes.   2. No acute fracture identified. Mild chronic compression deformity of T11.   3. Multilevel disc disease with the hypertrophic changes of posterior elements. However there is no tight stenosis or intradural abnormality.    3/22/18 MRI Brain IMPRESSION:  1) small, acute infarct posterior left frontal white matter in the subcortical region.  2) scattered chronic ischemic changes in both hemispheres with volume loss.    3/21/18 CT Head IMPRESSION:  Severe chronic microvascular changes without evidence of an acute transcortical infarction or hemorrhage.    3/21/18 CT C-Spine Impression:  Degenerative disc disease and spondylosis. No fracture. Generalized osteopenia compatible with advanced age.        ASSESSMENT & PLAN:  89F with right  LE weakness and functional deficits due to acute posterior left frontal infarct.  ~Medical management/workup as per primary team  ~Rehab: When medically stable, recommend acute rehab. Patient would benefit from and can tolerate 3 hours of therapy/day.

## 2018-03-22 NOTE — PROGRESS NOTE ADULT - ASSESSMENT
Patient admitted with symptom of legs giving out and unable to stand , in the er developed focal right le weakness, mri still pending, d/d include possible cva, r/o spinal stenosis, continue supportive care.

## 2018-03-23 DIAGNOSIS — I63.9 CEREBRAL INFARCTION, UNSPECIFIED: ICD-10-CM

## 2018-03-23 PROCEDURE — 99232 SBSQ HOSP IP/OBS MODERATE 35: CPT | Mod: GC

## 2018-03-23 PROCEDURE — 99233 SBSQ HOSP IP/OBS HIGH 50: CPT

## 2018-03-23 RX ORDER — CHOLECALCIFEROL (VITAMIN D3) 125 MCG
2000 CAPSULE ORAL DAILY
Qty: 0 | Refills: 0 | Status: DISCONTINUED | OUTPATIENT
Start: 2018-03-23 | End: 2018-03-24

## 2018-03-23 RX ADMIN — Medication 81 MILLIGRAM(S): at 12:45

## 2018-03-23 RX ADMIN — SENNA PLUS 2 TABLET(S): 8.6 TABLET ORAL at 21:36

## 2018-03-23 RX ADMIN — Medication 100 MILLIGRAM(S): at 05:27

## 2018-03-23 RX ADMIN — ATORVASTATIN CALCIUM 40 MILLIGRAM(S): 80 TABLET, FILM COATED ORAL at 21:36

## 2018-03-23 RX ADMIN — PANTOPRAZOLE SODIUM 40 MILLIGRAM(S): 20 TABLET, DELAYED RELEASE ORAL at 05:27

## 2018-03-23 RX ADMIN — Medication 2000 UNIT(S): at 12:45

## 2018-03-23 NOTE — PROGRESS NOTE ADULT - SUBJECTIVE AND OBJECTIVE BOX
Interval History:  right leg slightly better  MEDICATIONS  (STANDING):  aspirin  chewable 81 milliGRAM(s) Oral daily  atorvastatin 40 milliGRAM(s) Oral at bedtime  calcium carbonate  625 mG + Vitamin D (OsCal 250 + D) 1 Tablet(s) Oral two times a day  cholecalciferol 2000 Unit(s) Oral daily  docusate sodium 100 milliGRAM(s) Oral two times a day  enoxaparin Injectable 40 milliGRAM(s) SubCutaneous daily  pantoprazole    Tablet 40 milliGRAM(s) Oral before breakfast  senna 2 Tablet(s) Oral at bedtime    MEDICATIONS  (PRN):  acetaminophen   Tablet. 650 milliGRAM(s) Oral every 6 hours PRN Mild Pain (1 - 3)  bisacodyl Suppository 10 milliGRAM(s) Rectal daily PRN Constipation  polyethylene glycol 3350 17 Gram(s) Oral daily PRN Constipation      Allergies    codeine (Unknown)  Medrol (Unknown)  morphine (Unknown)  opium (Unknown)  predniSONE (Unknown)    Intolerances        PHYSICAL EXAM:  Vital Signs Last 24 Hrs  T(F): 98 (18 @ 07:56)  HR: 91 (18 @ 08:00)  BP: 169/70 (18 @ 08:00)  RR: 21 (18 @ 08:00)    GENERAL: NAD, well-groomed, well-developed  HEAD:  Atraumatic, Normocephalic  EYES: EOMI, PERRLA, conjunctiva and sclera clear  NECK: Supple, No JVD, Normal thyroid, no carotid bruit bilateral  NERVOUS SYSTEM:  Alert & Oriented X3, speech and language normal, cranial nerves II-XII normal,   Good concentration; Motor Strength 5/5 B/L uppe extremities;  right LE 2-3/5, left LE 5/5                      13.2   4.6   )-----------( 221      ( 22 Mar 2018 08:45 )             39.3         135  |  97<L>  |  6.0<L>  ----------------------------<  98  3.8   |  27.0  |  0.43<L>    Ca    8.5<L>      22 Mar 2018 08:44  Phos  3.1       Mg     2.0         TPro  7.6  /  Alb  4.2  /  TBili  0.4  /  DBili  x   /  AST  35<H>  /  ALT  14  /  AlkPhos  73  -    PT/INR - ( 21 Mar 2018 10:12 )   PT: 12.7 sec;   INR: 1.15 ratio         PTT - ( 21 Mar 2018 10:12 )  PTT:29.7 sec  Urinalysis Basic - ( 21 Mar 2018 11:09 )    Color: Yellow / Appearance: Clear / S.010 / pH: x  Gluc: x / Ketone: Negative  / Bili: Negative / Urobili: Negative mg/dL   Blood: x / Protein: 15 mg/dL / Nitrite: Negative   Leuk Esterase: Trace / RBC: x / WBC 0-2   Sq Epi: x / Non Sq Epi: Occasional / Bacteria: x        RADIOLOGY & ADDITIONAL STUDIES:

## 2018-03-23 NOTE — PROGRESS NOTE ADULT - SUBJECTIVE AND OBJECTIVE BOX
Resident Progress Note  Patient is a 89y old  Female who presents with a chief complaint of Fall (21 Mar 2018 19:47)    INTERVAL/OVERNIGHT EVENTS: Patient seen and examined bedside this morning. No acute events. States that the only place she will go after leaving the hospital is [place]. Patient able to move right leg    ROS: Denies chest pain, palpitations, sob, light headedness/dizziness difficulty breathing/cough, fevers/chills, abdominal pain, n/v, diarrhea/constipation,     Vital Signs Last 24 Hrs  T(C): 37.6 (23 Mar 2018 13:19), Max: 37.6 (23 Mar 2018 13:19)  T(F): 99.6 (23 Mar 2018 13:19), Max: 99.6 (23 Mar 2018 13:19)  HR: 81 (23 Mar 2018 12:45) (73 - 91)  BP: 144/80 (23 Mar 2018 12:45) (143/52 - 180/81)  BP(mean): 100 (23 Mar 2018 12:45) (66 - 109)  RR: 24 (23 Mar 2018 12:45) (12 - 24)  SpO2: 97% (23 Mar 2018 12:45) (97% - 100%)    22 Mar 2018 07:01  -  23 Mar 2018 07:00  --------------------------------------------------------  IN: 100 mL / OUT: 0 mL / NET: 100 mL    23 Mar 2018 07:01  -  23 Mar 2018 15:20  --------------------------------------------------------  IN: 120 mL / OUT: 0 mL / NET: 120 mL    PHYSICAL EXAM:  General: Elderly female in NAD  HEENT: NC AT EOMI, moist mucous membranes.  Neck: Supple  Respiratory: Clear to auscultation bilaterally, no wheezing, rales, or rhonchi.  Cardiovascular: RRR, +murmurs, rubs, or gallops.  Gastrointestinal: BS+, soft, non-tender, no masses palpable  Extremities: No peripheral edema.  Vascular: 2+ peripheral pulses.  Neurological: A/O x 3, moving all extremities  Psychiatric: Normal mood, normal affect.  Skin: No rashes.    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 650 milliGRAM(s) Oral every 6 hours PRN Mild Pain (1 - 3)  bisacodyl Suppository 10 milliGRAM(s) Rectal daily PRN Constipation  polyethylene glycol 3350 17 Gram(s) Oral daily PRN Constipation      LABS:                        13.2   4.6   )-----------( 221      ( 22 Mar 2018 08:45 )             39.3     03-22    135  |  97<L>  |  6.0<L>  ----------------------------<  98  3.8   |  27.0  |  0.43<L>    Ca    8.5<L>      22 Mar 2018 08:44  Phos  3.1     03-22  Mg     2.0     03-22

## 2018-03-23 NOTE — PROGRESS NOTE ADULT - PROBLEM SELECTOR PLAN 2
Initially with low BP, on admission likely due to hypovolemia  BP stable for now, occasionally SBP >150, will continue to monitor, continue to hold BP meds considering risk for hypovolemia --> hypoperfusion, and advanced age

## 2018-03-23 NOTE — PROGRESS NOTE ADULT - PROBLEM SELECTOR PLAN 1
c/w aspirin, statin  CT head negative, mri positive for small, acute infarct posterior left frontal white matter in the subcortical region, echo performed, not yet resulted, carotid duplex showed mild plague no hemodynamic abnormality  PT eval rec acute rehab, however patient insists on [facility], will therefore be sent to [facility]  Patient is extremely high risk as she has flaccid paralysis of right lower extremity.

## 2018-03-23 NOTE — PROGRESS NOTE ADULT - ASSESSMENT
89 year old female with pmh of GERD, admitted for CVA, CT negative, MRI noted to be positive for acute stroke. Patient is clinically stable and pending [facility]

## 2018-03-23 NOTE — PROGRESS NOTE ADULT - SUBJECTIVE AND OBJECTIVE BOX
INTERVAL HISTORY:  No acute events overnight. She had a small to moderate BM today, but is still worried about constipation. She expresses concern that she will not walk again and starts to get tearful. She also expressed worry about how she will handle her bills and taxes that are at her house. She is adamant that if she will require inpatient rehabilitation, she would go to [facility].          CURRENT FUNCTIONAL STATUS  BED MOBILITY: min assist  TRANSFERS: min/mod assist  GAIT: 3 steps RW mod assist  ADLS: LB dress mod assist    REVIEW OF SYSTEMS  Constitutional - No fatigue  HEENT - No visual disturbances, No difficulty hearing  Neurological - Right LE numbness, No headaches, No memory loss  Skin - No lesions  Musculoskeletal - Right LE weakness      VITALS  T(C): 36.7 (18 @ 07:56), Max: 37 (18 @ 20:00)  HR: 91 (18 @ 08:00) (73 - 91)  BP: 169/70 (18 @ 08:00) (143/52 - 180/81)  RR: 21 (18 @ 08:00) (12 - 21)  SpO2: 100% (18 @ 04:00) (98% - 100%)  Wt(kg): --    PHYSICAL EXAM:  General: NAD, anxious about management of her bills and taxes that are at home  HEENT: NCAT, EOMI  Cardio: S1/S2, warm & well perfused  Pulm: No respiratory distress  Abd: Soft, mild distension, nontender  Neuro: A+Ox3, speech intact  Light touch sensation decreased on right LE below the knee  Motor Strength 5/5 for B/L UE and Left LE  Motor Strength for Right LE: hip flexion 2/5, knee ext 2/5, plantarflexion 2/5, dorsiflexion 0/5, EHL 0/5        RECENT LABS/IMAGING  CBC Full  -  ( 22 Mar 2018 08:45 )  WBC Count : 4.6 K/uL  Hemoglobin : 13.2 g/dL  Hematocrit : 39.3 %  Platelet Count - Automated : 221 K/uL  Mean Cell Volume : 96.8 fl  Mean Cell Hemoglobin : 32.5 pg  Mean Cell Hemoglobin Concentration : 33.6 g/dL  Auto Neutrophil # : x  Auto Lymphocyte # : x  Auto Monocyte # : x  Auto Eosinophil # : x  Auto Basophil # : x  Auto Neutrophil % : x  Auto Lymphocyte % : x  Auto Monocyte % : x  Auto Eosinophil % : x  Auto Basophil % : x        135  |  97<L>  |  6.0<L>  ----------------------------<  98  3.8   |  27.0  |  0.43<L>    Ca    8.5<L>      22 Mar 2018 08:44  Phos  3.1       Mg     2.0           Urinalysis Basic - ( 21 Mar 2018 11:09 )    Color: Yellow / Appearance: Clear / S.010 / pH: x  Gluc: x / Ketone: Negative  / Bili: Negative / Urobili: Negative mg/dL   Blood: x / Protein: 15 mg/dL / Nitrite: Negative   Leuk Esterase: Trace / RBC: x / WBC 0-2   Sq Epi: x / Non Sq Epi: Occasional / Bacteria: x          MEDICATIONS   acetaminophen   Tablet. 650 milliGRAM(s) every 6 hours PRN  aspirin  chewable 81 milliGRAM(s) daily  atorvastatin 40 milliGRAM(s) at bedtime  bisacodyl Suppository 10 milliGRAM(s) daily PRN  calcium carbonate  625 mG + Vitamin D (OsCal 250 + D) 1 Tablet(s) two times a day  cholecalciferol 2000 Unit(s) daily  docusate sodium 100 milliGRAM(s) two times a day  enoxaparin Injectable 40 milliGRAM(s) daily  pantoprazole    Tablet 40 milliGRAM(s) before breakfast  polyethylene glycol 3350 17 Gram(s) daily PRN  senna 2 Tablet(s) at bedtime      --------------------------------------------------------------------      ASSESSMENT & PLAN:  89F with right  LE weakness and functional deficits due to acute posterior left frontal infarct.  ~Medical management/workup as per primary team  ~Could consider starting fluoxetine if no contraindication for motor recovery and mood aspect of her anxiety/concerns (See: FLAME trial)  ~Rehab: When medically stable, recommend acute rehab. Patient would benefit from and can tolerate 3 hours of therapy/day. However, patient insists on going to [facility] for rehabilitation if it is necessary that she goes to rehab. She states concerns about distance from her home and need to manager her bills and taxes. INTERVAL HISTORY:  No acute events overnight. She had a small to moderate BM today, but is still worried about constipation. She expresses concern that she will not walk again and starts to get tearful. She also expressed worry about how she will handle her bills and taxes that are at her house. She is adamant that if she will require inpatient rehabilitation, she would go to [facility].          CURRENT FUNCTIONAL STATUS  BED MOBILITY: min assist  TRANSFERS: min/mod assist  GAIT: 3 steps RW mod assist  ADLS: LB dress mod assist    REVIEW OF SYSTEMS  Constitutional - No fatigue  HEENT - No visual disturbances, No difficulty hearing  Neurological - Right LE numbness, No headaches, No memory loss  Skin - No lesions  Musculoskeletal - Right LE weakness      VITALS  T(C): 36.7 (18 @ 07:56), Max: 37 (18 @ 20:00)  HR: 91 (18 @ 08:00) (73 - 91)  BP: 169/70 (18 @ 08:00) (143/52 - 180/81)  RR: 21 (18 @ 08:00) (12 - 21)  SpO2: 100% (18 @ 04:00) (98% - 100%)  Wt(kg): --    PHYSICAL EXAM:  General: NAD, anxious about management of her bills and taxes that are at home  HEENT: NCAT, EOMI  Cardio: S1/S2, warm & well perfused  Pulm: No respiratory distress  Abd: Soft, mild distension, nontender  Neuro: A+Ox3, speech intact  Light touch sensation decreased on right LE below the knee  Motor Strength 5/5 for B/L UE and Left LE  Motor Strength for Right LE: hip flexion 2/5, knee ext 2/5, plantarflexion 2/5, dorsiflexion 0/5, EHL 0/5        RECENT LABS/IMAGING  CBC Full  -  ( 22 Mar 2018 08:45 )  WBC Count : 4.6 K/uL  Hemoglobin : 13.2 g/dL  Hematocrit : 39.3 %  Platelet Count - Automated : 221 K/uL  Mean Cell Volume : 96.8 fl  Mean Cell Hemoglobin : 32.5 pg  Mean Cell Hemoglobin Concentration : 33.6 g/dL  Auto Neutrophil # : x  Auto Lymphocyte # : x  Auto Monocyte # : x  Auto Eosinophil # : x  Auto Basophil # : x  Auto Neutrophil % : x  Auto Lymphocyte % : x  Auto Monocyte % : x  Auto Eosinophil % : x  Auto Basophil % : x        135  |  97<L>  |  6.0<L>  ----------------------------<  98  3.8   |  27.0  |  0.43<L>    Ca    8.5<L>      22 Mar 2018 08:44  Phos  3.1       Mg     2.0           Urinalysis Basic - ( 21 Mar 2018 11:09 )    Color: Yellow / Appearance: Clear / S.010 / pH: x  Gluc: x / Ketone: Negative  / Bili: Negative / Urobili: Negative mg/dL   Blood: x / Protein: 15 mg/dL / Nitrite: Negative   Leuk Esterase: Trace / RBC: x / WBC 0-2   Sq Epi: x / Non Sq Epi: Occasional / Bacteria: x          MEDICATIONS   acetaminophen   Tablet. 650 milliGRAM(s) every 6 hours PRN  aspirin  chewable 81 milliGRAM(s) daily  atorvastatin 40 milliGRAM(s) at bedtime  bisacodyl Suppository 10 milliGRAM(s) daily PRN  calcium carbonate  625 mG + Vitamin D (OsCal 250 + D) 1 Tablet(s) two times a day  cholecalciferol 2000 Unit(s) daily  docusate sodium 100 milliGRAM(s) two times a day  enoxaparin Injectable 40 milliGRAM(s) daily  pantoprazole    Tablet 40 milliGRAM(s) before breakfast  polyethylene glycol 3350 17 Gram(s) daily PRN  senna 2 Tablet(s) at bedtime      --------------------------------------------------------------------      ASSESSMENT & PLAN:  89F with right  LE weakness and functional deficits due to acute posterior left frontal infarct.  ~Medical management/workup as per primary team  ~Could consider starting fluoxetine if no contraindication for motor recovery and mood aspect of her anxiety/concerns (FLAME trial)  ~Rehab: When medically stable, recommend acute rehab. Patient would benefit from and can tolerate 3 hours of therapy/day. However, patient keen on going to [facility] for rehabilitation if it is necessary that she goes to rehab. ( She states concerns about distance from her home and need to manager her bills and taxes.)

## 2018-03-24 VITALS
OXYGEN SATURATION: 96 % | HEART RATE: 87 BPM | RESPIRATION RATE: 20 BRPM | SYSTOLIC BLOOD PRESSURE: 130 MMHG | DIASTOLIC BLOOD PRESSURE: 70 MMHG | TEMPERATURE: 98 F

## 2018-03-24 DIAGNOSIS — I50.32 CHRONIC DIASTOLIC (CONGESTIVE) HEART FAILURE: ICD-10-CM

## 2018-03-24 LAB
ANION GAP SERPL CALC-SCNC: 9 MMOL/L — SIGNIFICANT CHANGE UP (ref 5–17)
BUN SERPL-MCNC: 7 MG/DL — LOW (ref 8–20)
CALCIUM SERPL-MCNC: 8.2 MG/DL — LOW (ref 8.6–10.2)
CHLORIDE SERPL-SCNC: 97 MMOL/L — LOW (ref 98–107)
CO2 SERPL-SCNC: 27 MMOL/L — SIGNIFICANT CHANGE UP (ref 22–29)
CREAT SERPL-MCNC: 0.56 MG/DL — SIGNIFICANT CHANGE UP (ref 0.5–1.3)
GLUCOSE SERPL-MCNC: 86 MG/DL — SIGNIFICANT CHANGE UP (ref 70–115)
HCT VFR BLD CALC: 37.3 % — SIGNIFICANT CHANGE UP (ref 37–47)
HGB BLD-MCNC: 12.6 G/DL — SIGNIFICANT CHANGE UP (ref 12–16)
MAGNESIUM SERPL-MCNC: 1.8 MG/DL — SIGNIFICANT CHANGE UP (ref 1.6–2.6)
MCHC RBC-ENTMCNC: 32.5 PG — HIGH (ref 27–31)
MCHC RBC-ENTMCNC: 33.8 G/DL — SIGNIFICANT CHANGE UP (ref 32–36)
MCV RBC AUTO: 96.1 FL — SIGNIFICANT CHANGE UP (ref 81–99)
PHOSPHATE SERPL-MCNC: 3.3 MG/DL — SIGNIFICANT CHANGE UP (ref 2.4–4.7)
PLATELET # BLD AUTO: 192 K/UL — SIGNIFICANT CHANGE UP (ref 150–400)
POTASSIUM SERPL-MCNC: 3.6 MMOL/L — SIGNIFICANT CHANGE UP (ref 3.5–5.3)
POTASSIUM SERPL-SCNC: 3.6 MMOL/L — SIGNIFICANT CHANGE UP (ref 3.5–5.3)
RBC # BLD: 3.88 M/UL — LOW (ref 4.4–5.2)
RBC # FLD: 13.1 % — SIGNIFICANT CHANGE UP (ref 11–15.6)
SODIUM SERPL-SCNC: 133 MMOL/L — LOW (ref 135–145)
WBC # BLD: 5 K/UL — SIGNIFICANT CHANGE UP (ref 4.8–10.8)
WBC # FLD AUTO: 5 K/UL — SIGNIFICANT CHANGE UP (ref 4.8–10.8)

## 2018-03-24 PROCEDURE — 99285 EMERGENCY DEPT VISIT HI MDM: CPT | Mod: 25

## 2018-03-24 PROCEDURE — 97530 THERAPEUTIC ACTIVITIES: CPT

## 2018-03-24 PROCEDURE — 72146 MRI CHEST SPINE W/O DYE: CPT

## 2018-03-24 PROCEDURE — 80053 COMPREHEN METABOLIC PANEL: CPT

## 2018-03-24 PROCEDURE — 82306 VITAMIN D 25 HYDROXY: CPT

## 2018-03-24 PROCEDURE — 81001 URINALYSIS AUTO W/SCOPE: CPT

## 2018-03-24 PROCEDURE — 36415 COLL VENOUS BLD VENIPUNCTURE: CPT

## 2018-03-24 PROCEDURE — 97167 OT EVAL HIGH COMPLEX 60 MIN: CPT

## 2018-03-24 PROCEDURE — 93306 TTE W/DOPPLER COMPLETE: CPT

## 2018-03-24 PROCEDURE — 85610 PROTHROMBIN TIME: CPT

## 2018-03-24 PROCEDURE — 70551 MRI BRAIN STEM W/O DYE: CPT

## 2018-03-24 PROCEDURE — 72148 MRI LUMBAR SPINE W/O DYE: CPT

## 2018-03-24 PROCEDURE — 83036 HEMOGLOBIN GLYCOSYLATED A1C: CPT

## 2018-03-24 PROCEDURE — 99233 SBSQ HOSP IP/OBS HIGH 50: CPT

## 2018-03-24 PROCEDURE — 84100 ASSAY OF PHOSPHORUS: CPT

## 2018-03-24 PROCEDURE — 93005 ELECTROCARDIOGRAM TRACING: CPT

## 2018-03-24 PROCEDURE — 83735 ASSAY OF MAGNESIUM: CPT

## 2018-03-24 PROCEDURE — 85027 COMPLETE CBC AUTOMATED: CPT

## 2018-03-24 PROCEDURE — 97110 THERAPEUTIC EXERCISES: CPT

## 2018-03-24 PROCEDURE — 80048 BASIC METABOLIC PNL TOTAL CA: CPT

## 2018-03-24 PROCEDURE — 72125 CT NECK SPINE W/O DYE: CPT

## 2018-03-24 PROCEDURE — 70450 CT HEAD/BRAIN W/O DYE: CPT

## 2018-03-24 PROCEDURE — 85730 THROMBOPLASTIN TIME PARTIAL: CPT

## 2018-03-24 PROCEDURE — 92610 EVALUATE SWALLOWING FUNCTION: CPT

## 2018-03-24 PROCEDURE — 87086 URINE CULTURE/COLONY COUNT: CPT

## 2018-03-24 PROCEDURE — 93880 EXTRACRANIAL BILAT STUDY: CPT

## 2018-03-24 PROCEDURE — 84484 ASSAY OF TROPONIN QUANT: CPT

## 2018-03-24 PROCEDURE — 80061 LIPID PANEL: CPT

## 2018-03-24 RX ORDER — ATORVASTATIN CALCIUM 80 MG/1
1 TABLET, FILM COATED ORAL
Qty: 0 | Refills: 0 | COMMUNITY
Start: 2018-03-24

## 2018-03-24 RX ORDER — ASPIRIN/CALCIUM CARB/MAGNESIUM 324 MG
1 TABLET ORAL
Qty: 0 | Refills: 0 | COMMUNITY
Start: 2018-03-24

## 2018-03-24 RX ORDER — CIPROFLOXACIN LACTATE 400MG/40ML
1 VIAL (ML) INTRAVENOUS
Qty: 0 | Refills: 0 | COMMUNITY

## 2018-03-24 RX ORDER — RANITIDINE HYDROCHLORIDE 150 MG/1
75 TABLET, FILM COATED ORAL
Qty: 0 | Refills: 0 | COMMUNITY

## 2018-03-24 RX ORDER — CHOLECALCIFEROL (VITAMIN D3) 125 MCG
2000 CAPSULE ORAL
Qty: 0 | Refills: 0 | COMMUNITY
Start: 2018-03-24

## 2018-03-24 RX ADMIN — Medication 100 MILLIGRAM(S): at 06:14

## 2018-03-24 RX ADMIN — Medication 1 TABLET(S): at 06:14

## 2018-03-24 RX ADMIN — PANTOPRAZOLE SODIUM 40 MILLIGRAM(S): 20 TABLET, DELAYED RELEASE ORAL at 06:14

## 2018-03-24 NOTE — PROGRESS NOTE ADULT - ATTENDING COMMENTS
Chart reviewed.   Patient seen at bedside. Seated in chair. States that she feels ok, but has no feeling in her leg below her knee.   Neuro exam unchanged.    Acute rehab recommended in setting of recent acute stroke and for medical oversight. However patient prefers subacute rehab closer to home. Per [facility], to be discharged tomorrow  continue bedside therapy while here  Case discussed with Dr. Bansal yesterday  Thank you
Transfer to rehab

## 2018-03-24 NOTE — PROGRESS NOTE ADULT - PROBLEM SELECTOR PLAN 1
c/w aspirin, statin  CT head negative, mri positive for small, acute infarct posterior left frontal white matter in the subcortical region, echo shows chronic diastolic heart failure with preserved ejection fraction,  carotid duplex showed mild plague no hemodynamic abnormality  PT eval rec acute rehab, however patient insists on [facility], will therefore be sent to [facility]  Patient is extremely high risk as she has flaccid paralysis of right lower extremity.

## 2018-03-24 NOTE — PROGRESS NOTE ADULT - SUBJECTIVE AND OBJECTIVE BOX
KIM OLMSTEAD     Chief Complaint: Patient is a 89y old  Female who presents with a chief complaint of Fall (21 Mar 2018 19:47)      PAST MEDICAL & SURGICAL HISTORY:  Hypertension  Hiatal hernia  S/P appendectomy  H/O oophorectomy  History of tonsillectomy      HPI/OVERNIGHT EVENTS: Patient lying in bed, stable.    MEDICATIONS  (STANDING):  aspirin  chewable 81 milliGRAM(s) Oral daily  atorvastatin 40 milliGRAM(s) Oral at bedtime  calcium carbonate  625 mG + Vitamin D (OsCal 250 + D) 1 Tablet(s) Oral two times a day  cholecalciferol 2000 Unit(s) Oral daily  docusate sodium 100 milliGRAM(s) Oral two times a day  enoxaparin Injectable 40 milliGRAM(s) SubCutaneous daily  pantoprazole    Tablet 40 milliGRAM(s) Oral before breakfast  senna 2 Tablet(s) Oral at bedtime      Vital Signs Last 24 Hrs  T(C): 36.9 (24 Mar 2018 05:00), Max: 37.6 (23 Mar 2018 13:19)  T(F): 98.5 (24 Mar 2018 05:00), Max: 99.6 (23 Mar 2018 13:19)  HR: 69 (24 Mar 2018 05:00) (69 - 83)  BP: 141/69 (24 Mar 2018 05:00) (140/80 - 154/74)  BP(mean): 100 (23 Mar 2018 12:45) (100 - 100)  RR: 20 (24 Mar 2018 05:00) (20 - 24)  SpO2: 97% (23 Mar 2018 21:41) (96% - 97%)    PHYSICAL EXAM:  Constitutional: Frail elderly female  HEENT: PERRLA, EOMI, Normal Hearing, MMM  Neck: No LAD, No JVD  Back: Normal spine flexure, No CVA tenderness  Respiratory: CTAB Cardiovascular: S1 and S2, RRR, no M/G/R  Gastrointestinal: BS+, soft, NT/ND  Extremities: No peripheral edema  Vascular: 2+ peripheral pulses  Neurological: A/O x 3,      CAPILLARY BLOOD GLUCOSE    LABS:                        12.6   5.0   )-----------( 192      ( 24 Mar 2018 05:54 )             37.3     03-24    133<L>  |  97<L>  |  7.0<L>  ----------------------------<  86  3.6   |  27.0  |  0.56    Ca    8.2<L>      24 Mar 2018 05:54  Phos  3.3     03-24  Mg     1.8     03-24            RADIOLOGY & ADDITIONAL TESTS:

## 2018-04-13 ENCOUNTER — EMERGENCY (EMERGENCY)
Facility: HOSPITAL | Age: 83
LOS: 1 days | Discharge: HOME | End: 2018-04-13
Attending: EMERGENCY MEDICINE | Admitting: EMERGENCY MEDICINE
Payer: MEDICARE

## 2018-04-13 VITALS
OXYGEN SATURATION: 98 % | RESPIRATION RATE: 20 BRPM | DIASTOLIC BLOOD PRESSURE: 103 MMHG | TEMPERATURE: 98 F | HEART RATE: 90 BPM | SYSTOLIC BLOOD PRESSURE: 165 MMHG

## 2018-04-13 VITALS — WEIGHT: 115.96 LBS | HEIGHT: 63 IN

## 2018-04-13 DIAGNOSIS — Z90.49 ACQUIRED ABSENCE OF OTHER SPECIFIED PARTS OF DIGESTIVE TRACT: Chronic | ICD-10-CM

## 2018-04-13 DIAGNOSIS — Z90.89 ACQUIRED ABSENCE OF OTHER ORGANS: Chronic | ICD-10-CM

## 2018-04-13 DIAGNOSIS — Z90.721 ACQUIRED ABSENCE OF OVARIES, UNILATERAL: Chronic | ICD-10-CM

## 2018-04-13 PROCEDURE — 99282 EMERGENCY DEPT VISIT SF MDM: CPT

## 2018-04-13 PROCEDURE — 99283 EMERGENCY DEPT VISIT LOW MDM: CPT

## 2018-04-13 NOTE — ED PROVIDER NOTE - OBJECTIVE STATEMENT
89 y/o F pt with hx of HTN presents to ED recently seen and treated here for stroke, left with residual right left weakness and difficulty ambulating. She was D/C form here to [facility], been there for 3 weeks, signed herself out AMA earlier this morning because she felt like she was fine. Pt got home where she lives by herself and she fell several times. Pt brought herself back to ED today to bring herself back to [place].

## 2018-04-13 NOTE — ED PROVIDER NOTE - CHPI ED SYMPTOMS NEG
Report given to receiving nurse at [phone]. EMS to deliver discharge instructions to facility. Instructed to call for any questions. no fever/no dizziness/no vomiting/no nausea

## 2018-09-18 NOTE — ED ADULT NURSE NOTE - PSH
Excuse Slip    Fidelia Londono,           This is to certify that the above-named patient had an appointment at this office for professional attention on September 18, 2018.      Please excuse him/her:  (X)    FROM:   (X)    DUE TO:    xxx    Work      Injury       School      Illness       Gym  xxx    Other       Other        Comments:    May return to work 19 September 2018, or sooner for resolved or improved symptoms      Thank you,          James Chase PA-C  [address] [facility], [place] [address]  
H/O oophorectomy    History of tonsillectomy    S/P appendectomy

## 2019-07-03 NOTE — PHYSICAL THERAPY INITIAL EVALUATION ADULT - LIVES WITH, PROFILE
Reviewed with patient her AFP results and the recommendation for an early anatomy ultrasound. Discussed the results in detail and gave her the number. She was confused because she talked with genetics earlier and they told her that her cell free results were normal.  So we clarified the results. She thought the only genetic testing was the cell free but I did talk to her about the AFP at the visit and reminded her today that it would complete the genetics if she had that drawn,    She did not schedule appts when she left after last visit. She had multiple appts that day and she then went to lab. I discussed again that we would give her a check out sheet at her visit and she would make appts before she left. She has a far distance to drive and she has to request off of work 2 weeks in advance. We found some appts for follow up that she was able to attend  After the visit she felt that it was clarified. She has a hard time taking calls during the day so I told her if she had questions or concerns she can call at lunch hour.   There are nurses or myself available to talk at that time alone/house alone/house; 3 TYSON with handrail; pt does not use stairs inside

## 2019-08-21 ENCOUNTER — EMERGENCY (EMERGENCY)
Facility: HOSPITAL | Age: 84
LOS: 1 days | Discharge: HOME | End: 2019-08-21
Attending: EMERGENCY MEDICINE
Payer: MEDICARE

## 2019-08-21 VITALS
DIASTOLIC BLOOD PRESSURE: 76 MMHG | OXYGEN SATURATION: 96 % | RESPIRATION RATE: 18 BRPM | WEIGHT: 111.99 LBS | SYSTOLIC BLOOD PRESSURE: 136 MMHG | TEMPERATURE: 98 F | HEIGHT: 61 IN | HEART RATE: 89 BPM

## 2019-08-21 DIAGNOSIS — Z90.49 ACQUIRED ABSENCE OF OTHER SPECIFIED PARTS OF DIGESTIVE TRACT: Chronic | ICD-10-CM

## 2019-08-21 DIAGNOSIS — F54 PSYCHOLOGICAL AND BEHAVIORAL FACTORS ASSOCIATED WITH DISORDERS OR DISEASES CLASSIFIED ELSEWHERE: ICD-10-CM

## 2019-08-21 DIAGNOSIS — R69 ILLNESS, UNSPECIFIED: ICD-10-CM

## 2019-08-21 DIAGNOSIS — Z90.89 ACQUIRED ABSENCE OF OTHER ORGANS: Chronic | ICD-10-CM

## 2019-08-21 DIAGNOSIS — R41.81 AGE-RELATED COGNITIVE DECLINE: ICD-10-CM

## 2019-08-21 DIAGNOSIS — Z90.721 ACQUIRED ABSENCE OF OVARIES, UNILATERAL: Chronic | ICD-10-CM

## 2019-08-21 PROBLEM — I10 ESSENTIAL (PRIMARY) HYPERTENSION: Chronic | Status: ACTIVE | Noted: 2017-12-13

## 2019-08-21 LAB
ALBUMIN SERPL ELPH-MCNC: 4 G/DL — SIGNIFICANT CHANGE UP (ref 3.3–5.2)
ALP SERPL-CCNC: 72 U/L — SIGNIFICANT CHANGE UP (ref 40–120)
ALT FLD-CCNC: 10 U/L — SIGNIFICANT CHANGE UP
ANION GAP SERPL CALC-SCNC: 11 MMOL/L — SIGNIFICANT CHANGE UP (ref 5–17)
APTT BLD: 29.6 SEC — SIGNIFICANT CHANGE UP (ref 27.5–36.3)
AST SERPL-CCNC: 15 U/L — SIGNIFICANT CHANGE UP
BASOPHILS # BLD AUTO: 0.08 K/UL — SIGNIFICANT CHANGE UP (ref 0–0.2)
BASOPHILS NFR BLD AUTO: 1.4 % — SIGNIFICANT CHANGE UP (ref 0–2)
BILIRUB SERPL-MCNC: 0.4 MG/DL — SIGNIFICANT CHANGE UP (ref 0.4–2)
BUN SERPL-MCNC: 11 MG/DL — SIGNIFICANT CHANGE UP (ref 8–20)
CALCIUM SERPL-MCNC: 9.4 MG/DL — SIGNIFICANT CHANGE UP (ref 8.6–10.2)
CHLORIDE SERPL-SCNC: 97 MMOL/L — LOW (ref 98–107)
CO2 SERPL-SCNC: 25 MMOL/L — SIGNIFICANT CHANGE UP (ref 22–29)
CREAT SERPL-MCNC: 0.53 MG/DL — SIGNIFICANT CHANGE UP (ref 0.5–1.3)
EOSINOPHIL # BLD AUTO: 0.12 K/UL — SIGNIFICANT CHANGE UP (ref 0–0.5)
EOSINOPHIL NFR BLD AUTO: 2.1 % — SIGNIFICANT CHANGE UP (ref 0–6)
GLUCOSE SERPL-MCNC: 101 MG/DL — SIGNIFICANT CHANGE UP (ref 70–115)
HCT VFR BLD CALC: 34.9 % — SIGNIFICANT CHANGE UP (ref 34.5–45)
HGB BLD-MCNC: 11.5 G/DL — SIGNIFICANT CHANGE UP (ref 11.5–15.5)
IMM GRANULOCYTES NFR BLD AUTO: 0.3 % — SIGNIFICANT CHANGE UP (ref 0–1.5)
INR BLD: 1.16 RATIO — SIGNIFICANT CHANGE UP (ref 0.88–1.16)
LYMPHOCYTES # BLD AUTO: 1.73 K/UL — SIGNIFICANT CHANGE UP (ref 1–3.3)
LYMPHOCYTES # BLD AUTO: 29.9 % — SIGNIFICANT CHANGE UP (ref 13–44)
MCHC RBC-ENTMCNC: 29.7 PG — SIGNIFICANT CHANGE UP (ref 27–34)
MCHC RBC-ENTMCNC: 33 GM/DL — SIGNIFICANT CHANGE UP (ref 32–36)
MCV RBC AUTO: 90.2 FL — SIGNIFICANT CHANGE UP (ref 80–100)
MONOCYTES # BLD AUTO: 0.66 K/UL — SIGNIFICANT CHANGE UP (ref 0–0.9)
MONOCYTES NFR BLD AUTO: 11.4 % — SIGNIFICANT CHANGE UP (ref 2–14)
NEUTROPHILS # BLD AUTO: 3.17 K/UL — SIGNIFICANT CHANGE UP (ref 1.8–7.4)
NEUTROPHILS NFR BLD AUTO: 54.9 % — SIGNIFICANT CHANGE UP (ref 43–77)
PLATELET # BLD AUTO: 284 K/UL — SIGNIFICANT CHANGE UP (ref 150–400)
POTASSIUM SERPL-MCNC: 3.8 MMOL/L — SIGNIFICANT CHANGE UP (ref 3.5–5.3)
POTASSIUM SERPL-SCNC: 3.8 MMOL/L — SIGNIFICANT CHANGE UP (ref 3.5–5.3)
PROT SERPL-MCNC: 6.9 G/DL — SIGNIFICANT CHANGE UP (ref 6.6–8.7)
PROTHROM AB SERPL-ACNC: 13.4 SEC — HIGH (ref 10–12.9)
RBC # BLD: 3.87 M/UL — SIGNIFICANT CHANGE UP (ref 3.8–5.2)
RBC # FLD: 13.7 % — SIGNIFICANT CHANGE UP (ref 10.3–14.5)
SODIUM SERPL-SCNC: 133 MMOL/L — LOW (ref 135–145)
TROPONIN T SERPL-MCNC: <0.01 NG/ML — SIGNIFICANT CHANGE UP (ref 0–0.06)
WBC # BLD: 5.78 K/UL — SIGNIFICANT CHANGE UP (ref 3.8–10.5)
WBC # FLD AUTO: 5.78 K/UL — SIGNIFICANT CHANGE UP (ref 3.8–10.5)

## 2019-08-21 PROCEDURE — 96360 HYDRATION IV INFUSION INIT: CPT

## 2019-08-21 PROCEDURE — 93005 ELECTROCARDIOGRAM TRACING: CPT

## 2019-08-21 PROCEDURE — 70450 CT HEAD/BRAIN W/O DYE: CPT | Mod: 26

## 2019-08-21 PROCEDURE — 85027 COMPLETE CBC AUTOMATED: CPT

## 2019-08-21 PROCEDURE — 99218: CPT

## 2019-08-21 PROCEDURE — 36415 COLL VENOUS BLD VENIPUNCTURE: CPT

## 2019-08-21 PROCEDURE — 85610 PROTHROMBIN TIME: CPT

## 2019-08-21 PROCEDURE — 80053 COMPREHEN METABOLIC PANEL: CPT

## 2019-08-21 PROCEDURE — 99284 EMERGENCY DEPT VISIT MOD MDM: CPT | Mod: 25

## 2019-08-21 PROCEDURE — 84484 ASSAY OF TROPONIN QUANT: CPT

## 2019-08-21 PROCEDURE — 99284 EMERGENCY DEPT VISIT MOD MDM: CPT

## 2019-08-21 PROCEDURE — 70450 CT HEAD/BRAIN W/O DYE: CPT

## 2019-08-21 PROCEDURE — 93010 ELECTROCARDIOGRAM REPORT: CPT

## 2019-08-21 PROCEDURE — 85730 THROMBOPLASTIN TIME PARTIAL: CPT

## 2019-08-21 PROCEDURE — G0378: CPT

## 2019-08-21 PROCEDURE — 96361 HYDRATE IV INFUSION ADD-ON: CPT

## 2019-08-21 RX ORDER — SODIUM CHLORIDE 9 MG/ML
1000 INJECTION INTRAMUSCULAR; INTRAVENOUS; SUBCUTANEOUS ONCE
Refills: 0 | Status: COMPLETED | OUTPATIENT
Start: 2019-08-21 | End: 2019-08-21

## 2019-08-21 RX ADMIN — SODIUM CHLORIDE 1000 MILLILITER(S): 9 INJECTION INTRAMUSCULAR; INTRAVENOUS; SUBCUTANEOUS at 15:24

## 2019-08-21 RX ADMIN — SODIUM CHLORIDE 1000 MILLILITER(S): 9 INJECTION INTRAMUSCULAR; INTRAVENOUS; SUBCUTANEOUS at 09:21

## 2019-08-21 NOTE — ED CDU PROVIDER INITIAL DAY NOTE - OBJECTIVE STATEMENT
This is a 89 year old female BIB family recently discharged from [facility] x 1 day.  She notes has been at facility > 1 year.  She needs assistance with walking and bathing.  She reports is able to feed herself.  She uses a walker to ambulate.  She notes no medical complaints.

## 2019-08-21 NOTE — ED ADULT NURSE REASSESSMENT NOTE - MUSCLE PAIN OR WEAKNESS
Pt denies any pain. "Decreased functional mobility secondary to decreased strength, endurance, and balance," as per PT eval./yes

## 2019-08-21 NOTE — ED BEHAVIORAL HEALTH ASSESSMENT NOTE - NS ED BHA MED ROS ENDOCRINE
Assessment/Plan:      Diet reviewed  Lifestyle modifications reviewed  Medications reviewed and ordered  Laboratory tests and studies reviewed and ordered  All patient's questions answered to patient satisfaction  Diagnoses and all orders for this visit:    Type 2 diabetes mellitus without complication, without long-term current use of insulin ([facility])  -     CBC and differential; Future  -     Comprehensive metabolic panel; Future  -     Hemoglobin A1C; Future    Benign essential hypertension    Aortic prosthetic valve regurgitation, sequela    Multiple vessel coronary artery disease    Non-rheumatic mitral regurgitation    Localized edema    Hyponatremia with excess extracellular fluid volume    Other orders  -     ipratropium (ATROVENT) 0 03 % nasal spray; SPRAY 1 SPRAY INTO EACH NOSTRIL TWICE DAILY  -     fluticasone (FLONASE) 50 mcg/act nasal spray; SPRAY 1 SPRAY INTO EACH NOSTRIL TWICE DAILY        Subjective:      Patient ID: Camden Garcia is a 80 y o  male  HPI  This 66-year-old man is seen for the following:    Type 2 diabetes mellitus on metformin with recent hemoglobin A1c of 6 6  Dietary measures were reviewed, the patient is limited in his physical activity due to back pain  Hypertension well controlled    Hyponatremia is improved with a serum sodium of 135 with water restriction  Edema is controlled on the current medications      Current Outpatient Prescriptions:     allopurinol (ZYLOPRIM) 100 mg tablet, Take 100 mg by mouth daily, Disp: , Rfl: 3    amLODIPine (NORVASC) 2 5 mg tablet, Take 1 tablet (2 5 mg total) by mouth daily, Disp: 90 tablet, Rfl: 1    atorvastatin (LIPITOR) 20 mg tablet, Take 1 tablet (20 mg total) by mouth every 24 hours, Disp: 90 tablet, Rfl: 3    Blood Glucose Monitoring Suppl (ONE TOUCH ULTRA 2) w/Device KIT, by Does not apply route daily To test blood sugar 1x daily, Disp: 1 each, Rfl: 0    Blood Glucose Monitoring Suppl KIT, by Does not apply route daily Test once daily, Disp: 1 each, Rfl: 0    bumetanide (BUMEX) 1 mg tablet, Take 1 tablet (1 mg total) by mouth daily, Disp: 90 tablet, Rfl: 3    doxazosin (CARDURA) 4 mg tablet, Take 1 tablet (4 mg total) by mouth daily at bedtime, Disp: 90 tablet, Rfl: 1    fluticasone (FLONASE) 50 mcg/act nasal spray, SPRAY 1 SPRAY INTO EACH NOSTRIL TWICE DAILY, Disp: , Rfl: 11    glucose blood (ONE TOUCH ULTRA TEST) test strip, Use as instructed, Disp: 100 each, Rfl: 6    glucose blood (ONE TOUCH ULTRA TEST) test strip, To test blood sugar 1x daily, Disp: 100 each, Rfl: 6    ipratropium (ATROVENT) 0 03 % nasal spray, SPRAY 1 SPRAY INTO EACH NOSTRIL TWICE DAILY, Disp: , Rfl: 11    losartan (COZAAR) 100 MG tablet, Take 1 tablet (100 mg total) by mouth every 24 hours, Disp: 90 tablet, Rfl: 3    metFORMIN (GLUCOPHAGE-XR) 500 mg 24 hr tablet, Take 1 tablet (500 mg total) by mouth daily with dinner, Disp: 90 tablet, Rfl: 3    nitroglycerin (NITROSTAT) 0 4 mg SL tablet, Place 1 tablet (0 4 mg total) under the tongue every 5 (five) minutes as needed for chest pain, Disp: 90 tablet, Rfl: 1    ONE TOUCH LANCETS MISC, by Does not apply route daily To test blood sugar 1x daily, Disp: 100 each, Rfl: 6    pantoprazole (PROTONIX) 40 mg tablet, Take 1 tablet (40 mg total) by mouth daily at bedtime, Disp: 90 tablet, Rfl: 1    The following portions of the patient's history were reviewed and updated as appropriate: allergies, current medications, past family history, past medical history, past social history, past surgical history and problem list     Review of Systems   Constitutional: Negative for appetite change, fatigue, fever and unexpected weight change  HENT: Negative for rhinorrhea, sinus pain, sinus pressure, sneezing and sore throat  Eyes: Negative for visual disturbance  Respiratory: Negative for cough, chest tightness, shortness of breath and wheezing      Cardiovascular: Negative for chest pain, palpitations and leg swelling  Gastrointestinal: Negative for abdominal distention, abdominal pain, blood in stool, constipation, diarrhea, nausea and vomiting  Endocrine: Negative for polydipsia and polyuria  Genitourinary: Negative for decreased urine volume, difficulty urinating, dysuria, hematuria and urgency  Musculoskeletal: Negative for arthralgias, back pain, joint swelling and neck pain  Skin: Negative for rash  Allergic/Immunologic: Negative for environmental allergies  Neurological: Negative for tremors, weakness, light-headedness, numbness and headaches  Hematological: Does not bruise/bleed easily  Psychiatric/Behavioral: Negative for agitation, behavioral problems, confusion and dysphoric mood  The patient is not nervous/anxious            Family History   Problem Relation Age of Onset    Diabetes Mother     Alcohol abuse Brother     Bipolar disorder Brother     Hypertension Brother     Kidney disease Brother     Heart disease Family     Diabetes type II Family        Past Medical History:   Diagnosis Date    Aortic valve regurgitation 01/26/2017    Arthritis     Cancer ([facility] [address] )     Coronary artery disease     Edema 12/02/2016    Heart disease     Heart valve disorder     Heart valve replaced     History of basal cell cancer     Hx of tissue graft 03/29/2017    Hyperlipemia 01/26/2017    Hypertension     Non-rheumatic mitral regurgitation 10/21/2016    Right bundle branch block (RBBB), 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) 10/21/2017       Past Surgical History:   Procedure Laterality Date    APPENDECTOMY      CARDIAC SURGERY      CARDIAC VALVE REPLACEMENT      EYE SURGERY      HERNIA REPAIR      VASECTOMY         Social History     Social History    Marital status: /Civil Union     Spouse name: N/A    Number of children: 3    Years of education: N/A     Occupational History    retired      Social History Main Topics    Smoking status: Former Smoker     Types: Cigarettes, Cigars    Smokeless tobacco: Former User      Comment: quit 10 years ago, NEVER A SMOKER AS PER [employer]    Alcohol use No    Drug use: No    Sexual activity: Yes     Partners: Female     Other Topics Concern    None     Social History Narrative    PT states he does not drink any caffeine        Allergies   Allergen Reactions    Orphenadrine GI Intolerance         Objective:      /70   Pulse 76   Temp 97 7 °F (36 5 °C) (Tympanic)   Resp 16   Ht 5' 8" (1 727 m)   Wt 103 kg (227 lb 9 6 oz)   SpO2 95%   BMI 34 61 kg/m²        Physical Exam   Constitutional: He is oriented to person, place, and time  He appears well-developed and well-nourished  No distress  HENT:   Head: Normocephalic and atraumatic  Nose: Nose normal    Mouth/Throat: Oropharynx is clear and moist  No oropharyngeal exudate  Eyes: Pupils are equal, round, and reactive to light  Conjunctivae and EOM are normal  No scleral icterus  Neck: Normal range of motion  Neck supple  No JVD present  No tracheal deviation present  No thyromegaly present  Cardiovascular: Normal rate and regular rhythm  Exam reveals no gallop and no friction rub  Murmur heard  Pulmonary/Chest: Effort normal  No respiratory distress  He has no wheezes  He has no rales  He exhibits no tenderness  Abdominal: Soft  Bowel sounds are normal  He exhibits no distension and no mass  There is no tenderness  There is no rebound and no guarding  Musculoskeletal: Normal range of motion  He exhibits edema  He exhibits no deformity  Lymphadenopathy:     He has no cervical adenopathy  Neurological: He is alert and oriented to person, place, and time  No cranial nerve deficit  Coordination normal    Skin: Skin is warm and dry  No rash noted  Psychiatric: He has a normal mood and affect   His behavior is normal  Judgment and thought content normal  No complaints

## 2019-08-21 NOTE — PHYSICAL THERAPY INITIAL EVALUATION ADULT - SITTING BALANCE: DYNAMIC
Telephone Encounter by Serena Benedict at 02/15/17 11:39 AM     Author:  Serena Benedict Service:  (none) Author Type:  Patient      Filed:  02/15/17 11:39 AM Encounter Date:  2/15/2017 Status:  Signed     :  Serena Benedict (Patient )            Script faxed to pharmacy, confirmation recieved[CP1.1T]        Revision History        User Key Date/Time User Provider Type Action    > CP1.1 02/15/17 11:39 AM Serena Benedict Patient  Sign    T - Template             [employer]

## 2019-08-21 NOTE — CHART NOTE - NSCHARTNOTEFT_GEN_A_CORE
SOCIAL WORK NOTE:  RECEIVED  NOTIFICATION TO SEE PATIENT REGARDING INABILITY TO AMBULATE.  PATIENT IS ALERT AND ORIENTED. PATIENT CALLED AMBULANCE ON HERSELF WHEN SHE HAD INABILITY TO MOVE OFF THE COUCH.  PATIENT WAS HOME ALONE FOR 4 HOURS AFTER SIGNING OUT OF [facility] AMA.  PATIENT REPORTS THAT SHE WANTS TO RETURN.  THIS WORKER SPOKE WITH GUEVARA AT [facility] TO INQUIRE REGARDING THE SITUATION.  GUEVARA MADE THIS WORKER AWARE THAT PATIENT HAS RESIDED THERE X 1 1/2 YEARS. PATIENT NO LONGER HAS ANY INSURANCE BENEFITS AS THEY HAVE BEEN EXHAUSTED BY THEIR FACILITY.  THE PATIENT NEEDED TO COMPLETE THE CHRONIC CARE MEDICAID APPLICATION FOR CONVERSION BUT PATIENT HAS DECLINED AS SHE IS UNWILLING TO LOSE HER HOME.  [facility] SAID THAT PATIENT HAS SIGNED OUT AMA X 2 DURING HER LONG STAY THERE.  FINANCE FROM [facility] MET WITH BOTH THE PATIENT AND HER DAUGHTER TO MAKE THEM AWARE THAT IF PATIENT SIGNS OUT AMA SHE WILL NO LONGER BE WELCOME BACK AS SHE HAS A HISTORY OF DOING THAT AND ALSO NO PAYOR SOURCE AT THIS TIME.  PATIENT LEFT AND DAUGHTER BROUGHT HER HOME.  PATIENT STOPPED TO PURCHASE A WALKER ON THE WAY HOME.  PATIENT WAS HOME ALONE AND UNABLE TO TRANSFER.  PATIENT CALLED AMBULANCE.  PATIENT REQUESTED A RETURN TO SNF.  MADE HER AWARE THAT SNF DECLINED TO ACCEPT THE PATIENT BACK.  PATIENT BEHAVED IN A MANNER INDICATING SHE WAS SURPRISED AT THIS INFORMATION.  PLACED CALL TO DAUGHTER- RADHA.  WE APPRISED RADHA THAT [facility] WILL NOT ACCEPT THE PATIENT BACK. DAUGHTER RADHA ALSO STATED SHE WAS NOT AWARE OF THIS .  CCC AND THIS WORKER MADE HER AWARE THAT SNF WAS UNDER THE IMPRESSION SHE WAS AWARE.  RADHA STATED THAT WE CANNOT SEND HER MOTHER BACK HOME AND SHE ALSO ADDED THAT SHE CANNOT HANDLE HER MOTHER AND POOR DECISIONS ANY LONGER.  WE EDUCATED TAO REGARDING THE PLACEMENT PROCESS AND THAT PATIENT'S WHOM ARE ALERT AND ORIENTED HAVE TO AGREE TO A PLAN FOR PLACEMENT.  CAPACITY CONSULT WAS REQUESTED AND COMPLETED. PATIENT WITH CAPACITY AT THIS TIME.  PATIENT WAS PROVIDED WITH SNF LIST FOR BOTH [place] AND [facility].  PATIENT REFUSING TO GO ANYWHERE EXCEPT FOR [facility]. SHE REPORTED THAT SHE WILL GO HOME IF SHE CANT GO THERE.  EDUCATED THE PATIENT AGAIN ON HER LIMITATIONS AND HER INABILITY TO CARE FOR SELF.  GERMÁN WAS COMPLETED FOR THE PATIENT AFTER [facility] SPOKE WITH HER DAUGHTER AGAIN AND AN ADDITONAL FAMILY MEMBER- THERESA.  NOONE IS ABLE TO ASSIST HER AND NOONE FEELS PATIENT CAN RETURN HOME SAFELY AT THIS TIME.  DISCUSSED RECENT EPISODE WITH WALKER DIOR AND MADE THEM AWARE THAT HER LACK OF PAYOR SOURCE IS A BARRIER TO GOING TO ALTERNATIVE FACILITIES.  WALKER HAS BROUGHT THE PROBLEM TO [facility] AS THEY NEED TO MAKE FINAL DECISION IF THEY CAN ACCEPT THE PATIENT OR NOT,

## 2019-08-21 NOTE — ED BEHAVIORAL HEALTH ASSESSMENT NOTE - OTHER
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

## 2019-08-21 NOTE — ED PROVIDER NOTE - PROGRESS NOTE DETAILS
The patient seen by PT and has unstable gait  Seen by Case Management and SW and BH and will find new place to stay

## 2019-08-21 NOTE — ED ADULT NURSE REASSESSMENT NOTE - NSIMPLEMENTINTERV_GEN_ALL_ED
Implemented All Fall with Harm Risk Interventions:  [place] to call system. Call bell, personal items and telephone within reach. Instruct patient to call for assistance. Room bathroom lighting operational. Non-slip footwear when patient is off stretcher. Physically safe environment: no spills, clutter or unnecessary equipment. Stretcher in lowest position, wheels locked, appropriate side rails in place. Provide visual cue, wrist band, yellow gown, etc. Monitor gait and stability. Monitor for mental status changes and reorient to person, place, and time. Review medications for side effects contributing to fall risk. Reinforce activity limits and safety measures with patient and family. Provide visual clues: red socks.

## 2019-08-21 NOTE — ED ADULT NURSE NOTE - OBJECTIVE STATEMENT
assumed care of pt @ 0215. pt a&ox3. pt states that she has gotten increasingly more weak since [facility]. pt states that she was not able to get up tonight to walk to her bed but states that this is not new. pt reports that she called 911 tonight so she can go back to [facility] because she should not have left because she cannot live alone.

## 2019-08-21 NOTE — ED BEHAVIORAL HEALTH ASSESSMENT NOTE - SUMMARY
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  She basically has capacity to make decisions about her health and welfare notwithstanding poor decisions she has and is making.

## 2019-08-21 NOTE — ED BEHAVIORAL HEALTH ASSESSMENT NOTE - CURRENT MEDICATION
Home Medications: from March 2018   aspirin 81 mg oral tablet, chewable: 1 tab(s) orally once a day (24 Mar 2018 09:30)  atorvastatin 40 mg oral tablet: 1 tab(s) orally once a day (at bedtime) (24 Mar 2018 09:30)  calcium (as carbonate)-vitamin D 250 mg-125 intl units oral tablet: 1 tab(s) orally 2 times a day (24 Mar 2018 09:30)  cholecalciferol oral tablet: 2000 unit(s) orally once a day (24 Mar 2018 09:30)

## 2019-08-21 NOTE — ED PROVIDER NOTE - OBJECTIVE STATEMENT
The patient is a 89 year old female presents with a dizziness and weakness yesterday to the point where she is no longer ambulatory.  Currently denies HA, No CP, No SOB, No abd pain, No motor No sensory loss

## 2019-08-21 NOTE — ED ADULT TRIAGE NOTE - CHIEF COMPLAINT QUOTE
"The ambulance men brought me here" patient states that her grandson called the ambulance patient denies calling the ambulance, ems states that patient signed herself out AMA from [facility] and realizes that she can not live alone on her own. patient staets that she feels dizzy and weak, denies any pain

## 2019-08-21 NOTE — PROVIDER CONTACT NOTE (OTHER) - ACTION/TREATMENT ORDERED:
PT Goals( to achieve in 2 weeks);Pt c.g. assist with bed mobility. Pt c.g. assist with transfers.  Pt c.g. assist with amb with RW X 100 feet

## 2019-08-21 NOTE — ED PROVIDER NOTE - CLINICAL SUMMARY MEDICAL DECISION MAKING FREE TEXT BOX
The patient s/o AMA form [place] and cannot go home at this time and case management will find new place to stay

## 2019-08-21 NOTE — PHYSICAL THERAPY INITIAL EVALUATION ADULT - ADDITIONAL COMMENTS
Pt was resident of a SNF, requested to go home, family brought her home to a 2 story house with 4 steps to enter.  Were setting up HHA, pt reports having w/c and amb short distances with RW. Pt very vague with description of PLF. Was only home for 4 hours before coming to hospital.

## 2019-08-21 NOTE — ED ADULT NURSE NOTE - INTERVENTIONS DEFINITIONS
Stretcher in lowest position, wheels locked, appropriate side rails in place/Call bell, personal items and telephone within reach/[place] to call system/Non-slip footwear when patient is off stretcher

## 2019-08-21 NOTE — ED BEHAVIORAL HEALTH ASSESSMENT NOTE - DESCRIPTION
pleasant met with CCC and SW CT brain No acute changes  Vital Signs Last 24 Hrs  T(C): 36.4 (21 Aug 2019 11:11), Max: 36.9 (21 Aug 2019 07:31)  T(F): 97.6 (21 Aug 2019 11:11), Max: 98.4 (21 Aug 2019 07:31)  HR: 84 (21 Aug 2019 11:11) (75 - 89)  BP: 118/73 (21 Aug 2019 11:11) (118/73 - 151/80)  RR: 18 (21 Aug 2019 11:11) (18 - 18)  SpO2: 97% (21 Aug 2019 11:11) (96% - 97%)  no acute medical issues h/o CVA w left hemiparesis HLD retired LPN from [place] lives alone  twice  one daughter in [place]

## 2019-08-21 NOTE — ED BEHAVIORAL HEALTH ASSESSMENT NOTE - HPI (INCLUDE ILLNESS QUALITY, SEVERITY, DURATION, TIMING, CONTEXT, MODIFYING FACTORS, ASSOCIATED SIGNS AND SYMPTOMS)
patient  had been in [facility] and recently signed out AMA Now presents seeking readmission to [facility] but per CCC and SW unwilling to provide financial resources to ultimately qualify for long term, Medicaid Has partial hemiparesis from old CVA uses wheelchair and lives alone No involved family per SW and patient.

## 2019-08-21 NOTE — ED PROVIDER NOTE - CHPI ED SYMPTOMS NEG
no vomiting/no change in level of consciousness/no weakness/no confusion/no blurred vision/no loss of consciousness/no nausea/no numbness/no fever

## 2019-08-21 NOTE — ED CDU PROVIDER INITIAL DAY NOTE - ATTENDING CONTRIBUTION TO CARE
Genie: I performed a face to face bedside interview with patient regarding history of present illness, review of symptoms and past medical history. I completed an independent physical exam.  I have discussed patient's plan of care with advanced care provider.   I agree with note as stated above including HISTORY OF PRESENT ILLNESS, HIV, PAST MEDICAL/SURGICAL/FAMILY/SOCIAL HISTORY, ALLERGIES AND HOME MEDICATIONS, REVIEW OF SYSTEMS, PHYSICAL EXAM, MEDICAL DECISION MAKING and any PROGRESS NOTES during the time I functioned as the attending physician for this patient  unless otherwise noted. My brief assessment is as follows: 89F AMA'd from [place] one day ago, brought to ED as is unsafe at home, unable to walk. Denies dizziness. Pt seen by SW and PT (unable to walk). Dispo pending SW/Case management.

## 2019-08-22 VITALS
DIASTOLIC BLOOD PRESSURE: 73 MMHG | TEMPERATURE: 98 F | RESPIRATION RATE: 16 BRPM | SYSTOLIC BLOOD PRESSURE: 134 MMHG | HEART RATE: 87 BPM | OXYGEN SATURATION: 96 %

## 2019-08-22 PROCEDURE — 99217: CPT

## 2019-08-22 NOTE — ED CDU PROVIDER SUBSEQUENT DAY NOTE - HISTORY
89 year old female BIB family recently discharged from [place] x 1 day.  She notes has been at facility > 1 year.  She needs assistance with walking and bathing.  She reports is able to feed herself.  She uses a walker to ambulate.  She notes no medical complaints. SW following for placement

## 2019-08-22 NOTE — ED CDU PROVIDER SUBSEQUENT DAY NOTE - ATTENDING CONTRIBUTION TO CARE
I, Valente Llanos, have personally performed a face to face diagnostic evaluation on this patient. I have reviewed the ACP note and agree with the history, exam and plan of care, except as noted.    89 yo F brought in by family for not able to take care of her self. CT head negative. Blood work negative. refused UA. SW contacted and established placement.

## 2019-08-22 NOTE — ED CDU PROVIDER DISPOSITION NOTE - ATTENDING CONTRIBUTION TO CARE
I, Valente Llanos, have personally performed a face to face diagnostic evaluation on this patient. I have reviewed the ACP note and agree with the history, exam and plan of care, except as noted.    89 yo F brought in by family for not able to take care of her self. CT head negative. Blood work negative. refused UA. SW contacted and established placement.

## 2019-08-22 NOTE — ED ADULT NURSE REASSESSMENT NOTE - COMFORT CARE
meal provided
meal provided/assisted to bedpan
side rails up/warm blanket provided/darkened lights
wait time explained/plan of care explained/po fluids offered
with assist/ambulated to bathroom

## 2019-08-22 NOTE — ED ADULT NURSE REASSESSMENT NOTE - NS ED NURSE REASSESS COMMENT FT1
Patient awaiting NH placement. VSS. Meal tray provided. Patient in no acute distress. Will continue to monitor.
Pt alert and oriented, no apparent distress noted at this time. Pt handed off to hermilo SOLORZANO in stable condition.
Received report from RASHAD Llanos. Pt to be moved to CDU for observation.
assumed care of pt @ 1930, report received from Edmond SOLORZANO, charting as noted. pt AO to person, place, time, and situation. pt in NAD, Vital Signs Stable. pt denies any complaints. pt is normally oob with walker due to generalized weakness. awaiting placement, care management to followup with pt and pt family in AM. HR is WNL, lung sounds are clear b/l, abd is soft and nontender with positive bowel sounds in all four quadrants, skin is warm, dry and appropriate for age and race. pt educated on plan of care and observation stay. Plan of care taught back to RN. Proficiency determined from successful pt teach back. Pt oriented to unit, staff, and room. Pt reeducated on call bell use. Bed locked in lowest position, call bell within reach. All questions and concerns addressed.
pt lying comfortably on stretcher. pt has no complaints at this time. vitals remain stable. pt remains a&ox3. pt appears to be in no acute distress at this time. safety precautions remain in place. will continue to monitor.
pt outstanding for urinalysis. pt is incontinent of urine and bowel. pt is AOx4 with capacity, refusing straight cath to obtain urine sample. pt states she can use bed pan occasionally. RN will continue to encourage pt for urine sample.
pt. recieves from night RN. pt a&ox3. pt. states she signed herself
pt cleaned and changed by SNA. pt tolerated well. belongings placed in belongings bag. will continue to monitor.
Assumed care of the patient at 0730.Patient A&Ox3.No s/s of distress or pain. Patient awaiting SW for placement. Patient in understanding of plan of care. Patient with no further questions for the nurse. Resting comfortably. Will continue to monitor.

## 2019-08-22 NOTE — CHART NOTE - NSCHARTNOTEFT_GEN_A_CORE
SOCIAL WORK NOTE:  THIS WORKER SUBMITTED GERMÁN WITHIN 40 MILE RADIUS AT APPROXIMATELY 10:00 AM.  THIS WORKER HAS BEEN SPEAKING WITH ADMISSIONS AT [facility] SINCE THIS MORNING.  THIS WORKER SUGGESTED THEY COME TO MEET WITH PATIENT AND PERHAPS HAVE HER SIGN SOME TYPE OF CONTRACT OR STATEMENT THAT SHE WILL COOPERATE WITH A CHRONIC MEDICAID APPLICATION PRIOR TO SENDING HR INTO THE BUILDING.  ADMISSIONS AGREED TO SEND [place] FROM [facility] DUE TO HER GOOD RAPPORT WITH PATIENT BUT THEY ALSO NEED CLEARANCE BUT CORPORATE AND LEGAL. AWAITING THE ABOVE DECISION AND FOR ANY OTHER BED OFFERS. BARRIERS TO PLACEMENT AT THIS TIME WOULD BE FINANCES AS PATIENT EXHAUSTED HER MEDICARE AND MEDICAID BENEFITS.

## 2019-08-22 NOTE — ED CDU PROVIDER SUBSEQUENT DAY NOTE - PROGRESS NOTE DETAILS
Notes and results reviewed.  Patient sitting on stretcher, ROS: no fever or chills, no visual disturbances, no ear pain, sore throat or dysphagia, no CP, edema, no SOB, wheezing or cough, no abdominal pain, nausea, vomiting, diarrhea or constipation, no dysuria or hematuria, no back or neck pain, no HA, dizziness, or weakness, no rash, pruritis.  PE: Well developed well appearing, in NAD, PERRL, lungs CTA/BL, S1S2 RR, no edema, soft, NT to palpation, CARLY, no CVA tenderness, A&O x 3, MAEx 4,  5/5/ motors, = sensation, skin warm, dry and intact, no rash.  Awaiting placement.

## 2019-08-22 NOTE — PROVIDER CONTACT NOTE (OTHER) - ASSESSMENT
CM / LAUREN received a call back from the dtr Darvin who did financials w/ Adelaida at [facility].  After much ado, and a long conversation with her dtr, pt is in agreement to go to [facility] in [place]. LAUREN arranging renita for 6 pm.
Pt with 0/10 pain before, during or after RX.  Pt will benefit from PT to maximize functional independence.  Will continue to follow.  Pt left supine in bed in no apparent distress and call bell within reach. Nurse aware
Pt reports fatigue following treatment.  Pt returned and left supine in bed in no apparent distress and call bell within reach.  Will continue to follow. Nurse aware.

## 2019-08-22 NOTE — ED CDU PROVIDER DISPOSITION NOTE - CLINICAL COURSE
89 y/o F was recently released from [place].  Daughter brought her into ED due to her inability to care for herself.  Patient is incontinent and refused straight cath for UA.  Other labs, CT head no acute pathology.  Patient to be d/c to facility.

## 2019-08-22 NOTE — CHART NOTE - NSCHARTNOTEFT_GEN_A_CORE
SOCIAL WORK NOTE:  TOTAL OF 1 HOUR SPENT BY PATIENT'S BEDSIDE WITH [facility] REGARDING THE D/C PLAN.  MADE PATIENT AWARE THAT [facility] IS NOT AN OPTION AS IT WAS ESCALATED TO THE OWNER WHOM DECLINED TO ACCEPT THE PATIENT.  MADE PATIENT AWARE THAT THE ONLY 2 BED OFFERS WERE GIVEN- [facility] IN [facility] AND [facility] IN [place].  PATIENT WAS REFUSING TO ACCEPT ANY OTHER BED OTHER THAN [facility] AND INSISTED THAT SHE DAVID PREFER TO GO HOME.  PATIENT EXPRESSED BEING ANGRY AND GIVING US A DIFFICULT TIME REGARDING HER PLANNING AS PATIENT DOES NOT QUITE FULLY PLACE TRUST IN THE ONGOINGS.  PATIENT EXPRESSED AWARENESS OF NEEDING A HELP AT HOME AND NOT HAVING ANY BUT ALSO AT THE SAME TIME WOULD PREFER TO GO HOME THAN ANY OTHER SNF.  MULTIPLE PHONE CALLS MADE TO HER DAUGHTER- RADHA AS PATIENT CANNOT ENTER HOME WITHOUT HER AND ALSO PATIENT WILL NOT CONSIDER ANOTHER FACILITY  WITHOUT HER DAUGHTER'S INPUT. [facility] MADE US AWARE THAT THEY CAN ACCEPT THE PATIENT FOR LONG TERM ONLY AFTE RTHEY SPEAK TO DAUGHTER REGARDING THE FINANCIALS AS PATIENT HAS NO PAYOR SOURCE AT THIS TIME.  [facility] SPOKE WITH DAUGHTER APPROXIMATELY 2;30 PM.  MADE HER AWARE OF ALL THE AVAILABLE OPTIONS.  DAUGHTER WOUL LIKE TO ACCEPT THE BED OFFER WITH [facility].  WE PROVIDED THE PHONE NUMBER SO SHE MAY CALL FOR COMPLETION OF FINANCIAL.  MADE HER AWARE THAT SHE WOULD NEED TO SPEAK TO PATIENT REGARDING ACCEPTING THE BED AS PAYTIENT IS PRESENTLY DECLINING TO GO.

## 2019-08-22 NOTE — PROVIDER CONTACT NOTE (OTHER) - SITUATION
DC NEEDS
Chart reviewed and contents noted. Pt received supine in bed with c/o 0/10 pain before and after Rx.
PT order received, chart reviewed and contents noted.  PT eval completed and documented.

## 2019-08-22 NOTE — PROVIDER CONTACT NOTE (OTHER) - BACKGROUND
Supine <> sit with mod. assist.  Sit<>stand with RW and mod. assist.  Amb with RW and mod assist for 3 feet

## 2019-08-22 NOTE — PROVIDER CONTACT NOTE (OTHER) - RECOMMENDATIONS
Home with 24/7 assist if family willing and able to provide level of assist, vs SNF
Home with 24/7 assist if family willing and able to provide level of assist, vs [facility]

## 2019-08-22 NOTE — ED ADULT NURSE REASSESSMENT NOTE - GENERAL PATIENT STATE
comfortable appearance
cooperative/resting/sleeping/comfortable appearance/smiling/interactive
comfortable appearance

## 2020-01-01 NOTE — PATIENT PROFILE ADULT. - AS SC BRADEN MOISTURE
"[facility] [[id number]]  Chief Complaint   Patient presents with     Hypertension     Consult HTN     Initial BP (!) 71/40 (BP Location: Right arm, Patient Position: Sitting, Cuff Size: Infant)   Ht 0.515 m (1' 8.28\")   Wt 3.7 kg (8 lb 2.5 oz)   BMI 13.95 kg/m   Estimated body mass index is 13.95 kg/m  as calculated from the following:    Height as of this encounter: 0.515 m (1' 8.28\").    Weight as of this encounter: 3.7 kg (8 lb 2.5 oz).  Medication Reconciliation: complete    "
(3) occasionally moist

## 2020-02-12 NOTE — INPATIENT CERTIFICATION FOR MEDICARE PATIENTS - NS ICMP TWO DAYS INPATIENT
SUBJECTIVE:     Ry Hearn is a 13 Romina Looney MD  [facility]

## 2021-02-17 NOTE — ED BEHAVIORAL HEALTH ASSESSMENT NOTE - NS ED BHA MED ROS HEMATOLOGIC LYMPHATIC
Ears: no ear pain and no hearing problems. Nose: no nasal congestion and no nasal drainage. Mouth/Throat: no dysphagia, no hoarseness and no throat pain. Neck: no lumps, no pain, no stiffness and no swollen glands. No complaints

## 2021-03-11 NOTE — PROGRESS NOTE ADULT - SUBJECTIVE AND OBJECTIVE BOX
CC: Difficulty breathing      INTERVAL HPI/OVERNIGHT EVENTS: No acute events overnight. Seen by PT yesterday, recommend AYLIN, patient wants to go home. Insists she just needs oxygen and will be ok at home. Denies chest pain, SOB, dizziness, lightheadedness, nausea, vomiting, fever, chills    Vital Signs Last 24 Hrs  T(C): 36.9 (14 Dec 2017 08:18), Max: 36.9 (13 Dec 2017 23:56)  T(F): 98.5 (14 Dec 2017 08:18), Max: 98.5 (14 Dec 2017 08:18)  HR: 96 (14 Dec 2017 08:18) (90 - 106)  BP: 144/79 (14 Dec 2017 08:18) (144/79 - 167/73)  BP(mean): --  RR: 20 (14 Dec 2017 08:18) (18 - 20)  SpO2: 97% (14 Dec 2017 08:18) (95% - 98%)    PHYSICAL EXAM:    General: Well developed; well nourished; in no acute distress  Respiratory: Coarse BS, clears with cough, no rales, wheezes  Cardiovascular: Regular rate and rhythm. S1 and S2 Normal; No murmurs, gallops or rubs  Gastrointestinal: Soft non-tender non-distended; Normal bowel sounds; No hepatosplenomegaly  Extremities: Normal range of motion, No clubbing, cyanosis or edema  Vascular: Peripheral pulses palpable 2+ bilaterally  Neurological: Alert and oriented x4, no focal deficits  Skin: Warm and dry. No acute rash  Psychiatric: Cooperative and appropriate  I&O's Detail      CARDIAC MARKERS ( 12 Dec 2017 23:42 )  x     / <0.01 ng/mL / x     / x     / x                                14.9   8.3   )-----------( 298      ( 12 Dec 2017 23:42 )             43.6     12 Dec 2017 23:42    135    |  93     |  17.0   ----------------------------<  111    4.2     |  29.0   |  0.52     Ca    9.0        12 Dec 2017 23:42    TPro  7.5    /  Alb  3.9    /  TBili  0.4    /  DBili  x      /  AST  20     /  ALT  13     /  AlkPhos  72     12 Dec 2017 23:42      CAPILLARY BLOOD GLUCOSE        LIVER FUNCTIONS - ( 12 Dec 2017 23:42 )  Alb: 3.9 g/dL / Pro: 7.5 g/dL / ALK PHOS: 72 U/L / ALT: 13 U/L / AST: 20 U/L / GGT: x           Urinalysis Basic - ( 13 Dec 2017 15:22 )    Color: Yellow / Appearance: Clear / S.010 / pH: x  Gluc: x / Ketone: Trace  / Bili: Negative / Urobili: Negative mg/dL   Blood: x / Protein: 30 mg/dL / Nitrite: Negative   Leuk Esterase: Moderate / RBC: 0-2 /HPF / WBC 3-5   Sq Epi: x / Non Sq Epi: Occasional / Bacteria: Few        MEDICATIONS  (STANDING):  amLODIPine   Tablet 10 milliGRAM(s) Oral daily  cefTRIAXone   IVPB      enoxaparin Injectable 40 milliGRAM(s) SubCutaneous daily  famotidine   Suspension 20 milliGRAM(s) Oral two times a day  saccharomyces boulardii 250 milliGRAM(s) Oral two times a day    MEDICATIONS  (PRN):  acetaminophen   Tablet. 650 milliGRAM(s) Oral every 6 hours PRN Pain and/or Fever  ALBUTerol    0.083% 2.5 milliGRAM(s) Nebulizer every 4 hours PRN Shortness of Breath and/or Wheezing  guaiFENesin    Syrup 200 milliGRAM(s) Oral every 6 hours PRN Cough  ondansetron Injectable 4 milliGRAM(s) IV Push every 6 hours PRN Nausea and/or Vomiting      RADIOLOGY & ADDITIONAL TESTS:
INTERVAL HPI/OVERNIGHT EVENTS:  HPI:  89 y/o Female with PMH hiatal hernia, acid reflux, and HTN, presents with shortness of breath, cough productive of yellow sputum, and generalized weakness for 1 week. Patient recently discharged from [facility] yesterday after being admitted for RSV virus infection. Patient lives alone at home, ambulates using a cane, and she asked her neighbor who assists in her ADLs, to bring her to the ED because she was feeling short of breath and having a cough. Patient otherwise denies any nausea/vomiting, fever, sore throat, chest/abdominal pain, diarrhea, dysuria, sick contacts, or recent travel.     Of note patient, discharged on Proventil for her RSV infection however patient did not take this medication. Patient further evaluated by physical therapy at [facility] and it was recommended that the patient may benefit from AYLIN and/or home PT services. Patient initially refused rehab services but she is unable to care for herself as she feels weak and she is now agreeable for rehab or further placement. (13 Dec 2017 05:08)    The patient was seen and examined.  She is A&O in NAD, conversing fluently and responding appropriately.  There were no overnight events.  She reports that she is feeling well and denies SOB since she has been on oxygen,  or any new complaints.  She is still complaining of a productive cough.  She is inquiring about Home 02 because the only reason she came to the hospital was because she needed O2 because she was SOB.  She has no underlying respiratory PMH and does not normally require oxygen.  Test results, consultant recommendations and The Plan of Care were reviewed with the patient.        MEDICATIONS  (STANDING):  amLODIPine   Tablet 5 milliGRAM(s) Oral daily  enoxaparin Injectable 40 milliGRAM(s) SubCutaneous daily  famotidine   Suspension 20 milliGRAM(s) Oral two times a day    MEDICATIONS  (PRN):  acetaminophen   Tablet. 650 milliGRAM(s) Oral every 6 hours PRN Pain and/or Fever  ALBUTerol    0.083% 2.5 milliGRAM(s) Nebulizer every 4 hours PRN Shortness of Breath and/or Wheezing  guaiFENesin    Syrup 200 milliGRAM(s) Oral every 6 hours PRN Cough  ondansetron Injectable 4 milliGRAM(s) IV Push every 6 hours PRN Nausea and/or Vomiting      Allergies:  codeine (Unknown)  Medrol (Unknown)  morphine (Unknown)  opium (Unknown)  predniSONE (Unknown)      Vital Signs Last 24 Hrs  T(C): 36.7 (13 Dec 2017 07:48), Max: 36.9 (13 Dec 2017 02:59)  T(F): 98.1 (13 Dec 2017 07:48), Max: 98.4 (13 Dec 2017 02:59)  HR: 106 (13 Dec 2017 13:24) (87 - 106)  BP: 153/86 (13 Dec 2017 13:24) (144/89 - 153/86)  BP(mean): --  RR: 18 (13 Dec 2017 13:24) (18 - 28)  SpO2: 98% (13 Dec 2017 13:24) (96% - 98%)    General: Cachectic, A&O, in NAD, coughing but comfortable appearing.  HEENT:  NCAT,  PERRLA, EOMI, Nares Patent, Pharynx Clear, Uvula midline,   Neck: no lymphadenopathy, no JVD,   Lungs: Clear to auscultation, scattered wheezes b/l,  productive cough, no rhonchi, no rales b/l.  CV: RRR,  S1,S2,  no Murmur  ABD: +BS x 4; soft,  nontender, no distension,  No guarding,   MS: FROM throughout.  Muscle tone and strength equal b/l ,  no deformity  EXT:  No cyanosis, no clubbing, no edema b/l  +2 dorsalis pedis b/l  Neuro: A&O x 3; CN II- XII grossly intact.  No focal deficits,  No sensory or motor deficits. (Strength 5/5)     LABS:                          14.9   8.3   )-----------( 298      ( 12 Dec 2017 23:42 )             43.6         135  |  93<L>  |  17.0  ----------------------------<  111  4.2   |  29.0  |  0.52    Ca    9.0      12 Dec 2017 23:42    TPro  7.5  /  Alb  3.9  /  TBili  0.4  /  DBili  x   /  AST  20  /  ALT  13  /  AlkPhos  72  12-12      Urinalysis Basic - ( 13 Dec 2017 15:22 )    Color: Yellow / Appearance: Clear / S.010 / pH: x  Gluc: x / Ketone: Trace  / Bili: Negative / Urobili: Negative mg/dL   Blood: x / Protein: 30 mg/dL / Nitrite: Negative   Leuk Esterase: Moderate / RBC: 0-2 /HPF / WBC 3-5   Sq Epi: x / Non Sq Epi: Occasional / Bacteria: Few      CULTURES:  +RSV, +RVP    RADIOLOGY & ADDITIONAL TESTS:  CXR:  No acute pulmonary disease  CTA&P:  CT ANGIOGRAM:  No PE.  No intraabdominal abnormalities.    Incidental multiple hepatic cysts.  Cholelithiasis.  Severe atherosclerotic disease  colonic diverticulosis, hiatal hernia.
2020

## 2021-05-28 NOTE — INPATIENT CERTIFICATION FOR MEDICARE PATIENTS - CURRENT MEDICAL NEEDS AND CARE PLANS
Possible Skilled Nursing Facilty (SNF) Cryotherapy Text: The wound bed was treated with cryotherapy after the biopsy was performed.

## 2021-06-02 NOTE — DIETITIAN INITIAL EVALUATION ADULT. - DOB: +DATEOFBIRTH
Statement Selected

## 2021-07-09 NOTE — ED ADULT NURSE NOTE - TEMPLATE LIST FOR HEAD TO TOE ASSESSMENT
Respiratory
49 y/o female with PMHx of c/o Left sided abdominal pain. Pt went to urgent care. Pt states she drinks a glass or two of wine every day. Pt had white blood cells in her urine at urgent care. Pt denies vomiting

## 2022-02-14 NOTE — PHYSICAL THERAPY INITIAL EVALUATION ADULT - ORIENTATION, REHAB EVAL
"-- DO NOT REPLY / DO NOT REPLY ALL --  -- Message is from the [facility]--    General Patient Message      Reason for Call: School nurse, Ben Ramires, is asking for the status of the seizure action plan that had been faxed to the office. Per nurse, the school needs the seizure action plan or patient can not participate in an activity at school today. Please call back at [phone]. Fax is [phone]. Caller Information       Type Contact Phone    02/14/2022 09:36 AM CST Phone (Incoming) school nurse Irma Rupa (Other) [phone]          Alternative phone number: n/a    Turnaround time given to caller: ""This message will be sent to [facility] Provider's name]. The clinical team will fulfill your request as soon as they review your message. \""    " oriented to person, place, time and situation

## 2022-04-08 NOTE — ED PROVIDER NOTE - NEUROLOGICAL, MLM
Alert and oriented, no focal deficits, no motor or sensory deficits. Otezla Counseling: The side effects of Otezla were discussed with the patient, including but not limited to worsening or new depression, weight loss, diarrhea, nausea, upper respiratory tract infection, and headache. Patient instructed to call the office should any adverse effect occur.  The patient verbalized understanding of the proper use and possible adverse effects of Otezla.  All the patient's questions and concerns were addressed.

## 2022-04-12 NOTE — ED ADULT NURSE NOTE - MUSCULOSKELETAL ASSISTIVE DEVICES
[Patient Intake Form Reviewed] : Patient intake form was reviewed [As Noted in HPI] : as noted in HPI [Negative] : Heme/Lymph walker

## 2022-05-11 NOTE — ED ADULT NURSE REASSESSMENT NOTE - NSFALLRSKINDICATORS_ED_ALL_ED
yes Pt. can read and write in [place]./Able to only read/write in language other than English (specify) Pt. can read and write in [place]. Pt is from [facility] and came to the  in 1994./Able to only read/write in language other than English (specify)

## 2022-07-27 NOTE — OCCUPATIONAL THERAPY INITIAL EVALUATION ADULT - LEVEL OF INDEPENDENCE, OT EVAL
COVID Screening completed. Patient denies complaints, no changes to PMH or medications. Patient tolerates exercise well. Patient attends Nutrition Workshop \"Label Reading\".  Electronically signed by Sudheer Mariee RN on 7/27/2022 at 4:11 PM moderate assist (50% patients effort)

## 2022-11-09 NOTE — ED ADULT TRIAGE NOTE - ARRIVAL INFO ADDITIONAL COMMENTS
Antonia Marc  [address] [place] [id number]          To Whom This May Concern:                 code sepsis actived

## 2022-11-10 NOTE — ED ADULT TRIAGE NOTE - CCCP TRG CHIEF CMPLNT
Medicare Wellness Visit patient outreach outcome:  Patient was left a message to return call to schedule her MWV.  
fall

## 2023-08-25 NOTE — PHYSICAL THERAPY INITIAL EVALUATION ADULT - GENERAL OBSERVATIONS, REHAB EVAL
HM; pt rec'd in bed supine; NA Cinthya present; HR 88; pt denied pain
no back pain, no gout, no musculoskeletal pain, no neck pain, and no weakness.

## 2023-08-31 NOTE — OCCUPATIONAL THERAPY INITIAL EVALUATION ADULT - PHYSICAL ASSIST/NONPHYSICAL ASSIST: SIT/STAND, REHAB EVAL
[de-identified] : Lumbar spine Positive PSIS tenderness to palpation bilaterally Positive Johnathon's bilaterally Positive compression test bilaterally Positive Gaenslen's bilaterally  Left shoulder:  TTP to left bicep tendon verbal cues/1 person assist

## 2023-11-03 NOTE — ED ADULT NURSE NOTE - BOWEL SOUNDS RLQ
Occupational Therapy  Treatment    Name: Zuly Hernandez    : 1935 (88 y.o.)  MRN: [id number]           TREATMENT SUMMARY AND RECOMMENDATIONS:      OT Date of Treatment: 23  OT Start Time: 900  OT Stop Time: 930  OT Total Time (min): 30 min      Subjective Assessment:   No complaints  Lethargic   x Awake, alert, cooperative  Impulsive    Uncooperative   Flat affect    Agitated  c/o pain    Appropriate  c/o fatigue    Confused x Treated at bedside     Emotionally labile  Treated in gym/dept.      Other:        Therapy Precautions:    Cognitive deficits  Spinal precautions    Collar - hard  Sternal precautions   x Collar - soft   TLSO   x Fall risk  LSO    Hip precautions - posterior  Knee immobilizer    Hip precautions - anterior  WBAT    Impaired communication  Partial weightbearing    Oxygen  TTWB    PEG tube  NWB    Visual deficits      Hearing deficits   Other:        Treatment Objectives:     Mobility Training:    Mobility task Assist level Comments    Bed mobility Min A Supine>EOB    Transfer Min A Stand/pivot t/f with RW EOB>w/c   Sit to stands transitions     Functional mobility CGA X25 feet with RW    Sitting balance     Standing balance      Other:        ADL Training:    ADL Assist level Comments    Feeding     Grooming/hygiene     Bathing     Upper body dressing Min A Pt required some assist for placement to thread UEs; able to button shirt   Lower body dressing Min A Pt required assist to johnny R sock and B shoes; able to johnyn pants with standing balance assist    Toileting Min A (+) BM; pt able to wipe in standing with assist for balance    Toilet transfer Min A Stand/pivot t/f with RW to BSC    Adaptive equipment training     Other:         Additional Comments:      Assessment: Patient tolerated session well.    GOALS:   Multidisciplinary Problems       Occupational Therapy Goals          Problem: Occupational Therapy    Goal Priority Disciplines Outcome Interventions   Occupational  Therapy Goal     OT, PT/OT Ongoing, Progressing    Description: Goals to be met by: 11/9/23     Patient will increase functional independence with ADLs by performing:    UE Dressing with Set-up Assistance.  LE Dressing with Minimal Assistance.  Grooming while standing at sink with Stand-by Assistance.  Toileting from bedside commode with Minimal Assistance for hygiene and clothing management.   Bathing from  shower chair/bench with Minimal Assistance.  Toilet transfer to bedside commode with Contact Guard Assistance.                         Recommendations:     Discharge Recommendations: home with home health  Discharge Equipment Recommendations:  none  Barriers to discharge:  None     Plan:     Patient to be seen 6 x/week to address the above listed problems via self-care/home management, therapeutic activities, therapeutic exercises  Plan of Care Expires: 11/09/23  Plan of Care Reviewed with: patient, son  Revisions made to plan of care: No      Skilled OT Minutes Provided: 30  Communication with Treatment Team:     Equipment recommendations:       At end of treatment, patient remained:   Up in chair     Up in wheelchair in room    In bed    With alarm activated    Bed rails up    Call bell in reach     Family/friends present    Restraints secured properly    In bathroom with CNA/RN notified   x In gym with PT/PTA/tech    Nurse aware    Other:             present

## 2024-10-07 NOTE — ED ADULT NURSE NOTE - NS PRO PASSIVE SMOKE EXP
Assessment & Plan     Type 2 diabetes mellitus with other circulatory complication, without long-term current use of insulin (H)  Great or better control. No issues . Seeing Eye doc this next month. Continue current medications and behavioral changes.   Follow-up in 6 months   - Basic metabolic panel; Future  - Hemoglobin A1c; Future  - Albumin Random Urine Quantitative with Creat Ratio; Future  - Lipid Profile; Future  - Lipid Profile  - Albumin Random Urine Quantitative with Creat Ratio  - Hemoglobin A1c  - Basic metabolic panel  - pioglitazone (ACTOS) 15 MG tablet; Take 1 tablet (15 mg) by mouth daily.    Complex sleep apnea syndrome  Has been working on this. Started CPAP and doing better. Some notes reviewed In chart   - Basic metabolic panel; Future  - Hemoglobin A1c; Future  - Albumin Random Urine Quantitative with Creat Ratio; Future  - Lipid Profile; Future  - Lipid Profile  - Albumin Random Urine Quantitative with Creat Ratio  - Hemoglobin A1c  - Basic metabolic panel    Essential hypertension  Stable. Continue current medications and behavioral changes.   - Basic metabolic panel; Future  - Hemoglobin A1c; Future  - Albumin Random Urine Quantitative with Creat Ratio; Future  - Lipid Profile; Future  - Lipid Profile  - Albumin Random Urine Quantitative with Creat Ratio  - Hemoglobin A1c  - Basic metabolic panel    Chronic obstructive pulmonary disease with acute exacerbation (H)  Stable overall. Continue current medications and behavioral changes.   - Basic metabolic panel; Future  - Hemoglobin A1c; Future  - Albumin Random Urine Quantitative with Creat Ratio; Future  - Lipid Profile; Future  - Lipid Profile  - Albumin Random Urine Quantitative with Creat Ratio  - Hemoglobin A1c  - Basic metabolic panel    Pulmonary mass  Needs CT scan. Has appt and [facility] at end of month   - Basic metabolic panel; Future  - Hemoglobin A1c; Future  - Albumin Random Urine Quantitative with Creat Ratio;  Future  - Lipid Profile; Future  - Lipid Profile  - Albumin Random Urine Quantitative with Creat Ratio  - Hemoglobin A1c  - Basic metabolic panel    S/P CABG (coronary artery bypass graft)  No issues. No angina. Keeping busy. Continue current medications and behavioral changes.   - ARIPiprazole (ABILIFY) 5 MG tablet; Take 1 tablet (5 mg) by mouth every morning.  - atorvastatin (LIPITOR) 80 MG tablet; Take 1 tablet (80 mg) by mouth every morning.    S/P MVR (mitral valve replacement)  No issues   - ARIPiprazole (ABILIFY) 5 MG tablet; Take 1 tablet (5 mg) by mouth every morning.    Urge incontinence of urine  Much improved with meds. Continue current medications and behavioral changes.   - oxyBUTYnin ER (DITROPAN XL) 15 MG 24 hr tablet; Take 1 tablet (15 mg) by mouth daily.    Gastroesophageal reflux disease, unspecified whether esophagitis present  Stable - RF needed.   - pantoprazole (PROTONIX) 40 MG EC tablet; Take 1 tablet (40 mg) by mouth daily.      Flu and Covid shot given     BMI  Estimated body mass index is 33.87 kg/m  as calculated from the following:    Height as of this encounter: 1.829 m (6').    Weight as of this encounter: 113.3 kg (249 lb 11.2 oz).             No follow-ups on file.    Claribel Macias is a 71 year old, presenting for the following health issues:  Follow Up        10/7/2024     2:01 PM   Additional Questions   Roomed by Elizabet MONTGOMERY   Accompanied by self     HPI     Diabetes Follow-up    How often are you checking your blood sugar? Not at all  What concerns do you have today about your diabetes? None   Do you have any of these symptoms? (Select all that apply)  No numbness or tingling in feet.  No redness, sores or blisters on feet.  No complaints of excessive thirst.  No reports of blurry vision.  No significant changes to weight.      BP Readings from Last 2 Encounters:   10/07/24 114/64   07/25/24 133/79     Hemoglobin A1C (%)   Date Value   06/25/2024 7.6 (H)   03/15/2024 7.0 (H)  "  02/15/2021 6.5 (H)     LDL Cholesterol Calculated (mg/dL)   Date Value   05/04/2023 49   08/29/2022 56   08/21/2020 68   10/02/2019 97             Hypertension Follow-up    Do you check your blood pressure regularly outside of the clinic? Yes   Are you following a low salt diet? Yes  Are your blood pressures ever more than 140 on the top number (systolic) OR more   than 90 on the bottom number (diastolic), for example 140/90? No    COPD Follow-Up  Overall, how are your COPD symptoms since your last clinic visit?  No change  How much fatigue or shortness of breath do you have when you are walking?  Same as usual  How much shortness of breath do you have when you are resting?  Same as usual  How often do you cough? Rarely  Have you noticed any change in your sputum/phlegm?  No  Have you experienced a recent fever? No  Please describe how far you can walk without stopping to rest:  2-5 blocks  How many flights of stairs are you able to walk up without stopping?  Unknown   Have you had any Emergency Room Visits, Urgent Care Visits, or Hospital Admissions because of your COPD since your last office visit?  No    History   Smoking Status    Former    Types: Cigarettes   Smokeless Tobacco    Never     No results found for: \"FEV1\", \"[id number]\"        Review of Systems  Constitutional, neuro, ENT, endocrine, pulmonary, cardiac, gastrointestinal, genitourinary, musculoskeletal, integument and psychiatric systems are negative, except as otherwise noted.      Objective    /64 (BP Location: Left arm, Patient Position: Sitting, Cuff Size: Adult Large)   Pulse 58   Temp 97.8  F (36.6  C) (Tympanic)   Ht 1.829 m (6')   Wt 113.3 kg (249 lb 11.2 oz)   SpO2 96%   BMI 33.87 kg/m    Body mass index is 33.87 kg/m .  Physical Exam   GENERAL: alert and no distress  NECK: no adenopathy, no asymmetry, masses, or scars  RESP: lungs clear to auscultation - no rales, rhonchi or wheezes  CV: regular rate and rhythm, normal S1 S2, no " S3 or S4, no murmur, click or rub, no peripheral edema  ABDOMEN: soft, nontender, no hepatosplenomegaly, no masses and bowel sounds normal  MS: no gross musculoskeletal defects noted, no edema    Results for orders placed or performed in visit on 10/07/24   Lipid Profile     Status: Abnormal   Result Value Ref Range    Cholesterol 155 <200 mg/dL    Triglycerides 247 (H) <150 mg/dL    Direct Measure HDL 61 >=40 mg/dL    LDL Cholesterol Calculated 45 <100 mg/dL    Non HDL Cholesterol 94 <130 mg/dL    Patient Fasting > 8hrs? No     Narrative    Cholesterol  Desirable: < 200 mg/dL  Borderline High: 200 - 239 mg/dL  High: >= 240 mg/dL    Triglycerides  Normal: < 150 mg/dL  Borderline High: 150 - 199 mg/dL  High: 200-499 mg/dL  Very High: >= 500 mg/dL    Direct Measure HDL  Female: >= 50 mg/dL   Male: >= 40 mg/dL    LDL Cholesterol  Desirable: < 100 mg/dL  Above Desirable: 100 - 129 mg/dL   Borderline High: 130 - 159 mg/dL   High:  160 - 189 mg/dL   Very High: >= 190 mg/dL    Non HDL Cholesterol  Desirable: < 130 mg/dL  Above Desirable: 130 - 159 mg/dL  Borderline High: 160 - 189 mg/dL  High: 190 - 219 mg/dL  Very High: >= 220 mg/dL   Hemoglobin A1c     Status: Abnormal   Result Value Ref Range    Estimated Average Glucose 160 (H) <117 mg/dL    Hemoglobin A1C 7.2 (H) <5.7 %   Basic metabolic panel     Status: Abnormal   Result Value Ref Range    Sodium 137 135 - 145 mmol/L    Potassium 4.5 3.4 - 5.3 mmol/L    Chloride 103 98 - 107 mmol/L    Carbon Dioxide (CO2) 23 22 - 29 mmol/L    Anion Gap 11 7 - 15 mmol/L    Urea Nitrogen 20.9 8.0 - 23.0 mg/dL    Creatinine 0.99 0.67 - 1.17 mg/dL    GFR Estimate 81 >60 mL/min/1.73m2    Calcium 9.6 8.8 - 10.4 mg/dL    Glucose 135 (H) 70 - 99 mg/dL    Patient Fasting > 8hrs? No              Signed Electronically by: Rock Basilio MD    Answers submitted by the patient for this visit:  Patient Health Questionnaire (Submitted on 10/7/2024)  If you checked off any problems, how  difficult have these problems made it for you to do your work, take care of things at home, or get along with other people?: Not difficult at all  PHQ9 TOTAL SCORE: 0     No

## 2025-01-25 NOTE — ED ADULT NURSE NOTE - TEMPLATE
Emergency Department Encounter  Location: [facility] EMERGENCY DEPARTMENT    Patient: Kee Carranza  MRN: [id number]  : 1996  Date of evaluation: 2025  ED Provider: Malachi Armas DO, FACEP    Chief Complaint:    Headache (Thinks he may have been exposed to carbon monoxide Thursday and has had a HA since.)    [place]:  Kee Carranza is a 28 y.o. male that presents to the emergency department with complaints of headache.  The patient states he was exposed to carbon monoxide on Thursday.  He is had an headache since that time.  The patient states that his pipes in his crawl space frozen so he put his space heater in that area and was running it for about 2-2 and half hours yesterday morning around 7 AM.  The patient states since that time he has developed a headache.  He finally thought that he might be exposing himself to carbon monoxide so he shot the heater down and is trying to air his house out.  He denies any nausea or vomiting and denies any shortness of breath.  He states he has been using Tylenol which has helped his headache.  He denies other associated signs and symptoms    ROS:  At least 4 systems reviewed and otherwise acutely negative except as in the [place].  Negative for fever or chills  Negative for chest pain  Negative for shortness of breath  Negative for nausea vomiting diarrhea or constipation    History reviewed. No pertinent past medical history.  History reviewed. No pertinent surgical history.  Family History   Problem Relation Age of Onset    No Known Problems Mother     No Known Problems Father     No Known Problems Sister     No Known Problems Brother     No Known Problems Maternal Aunt     No Known Problems Maternal Uncle     No Known Problems Paternal Aunt     No Known Problems Paternal Uncle     Diabetes Maternal Grandmother      Social History     Socioeconomic History    Marital status: Single     Spouse name: Not on file    Number of children: Not on file    Years of 
Respiratory

## 2025-03-04 NOTE — OCCUPATIONAL THERAPY INITIAL EVALUATION ADULT - STEREOGNOSIS, LUE, OT EVAL
[facility] Ambulatory Established Patient Note    Chief Complaint   Patient presents with    Follow-up    Office Visit       Subjective:  Augustine Bishop is a 79 year old male seen today, 03/04/25, in follow up for overactive bladder/lower urinary tract symptoms.  Per chart review of old records, lab values/study results, and patient-given history, patient presents for follow up.     Patient presents with many year history of lower urinary tract symptoms.  Patient has been on numerous medications over the year including tamsulosin, alfuzosin, oxybutynin, Myrbetriq. He was seen by Dr. Martinez on 12/4/2024 and was started on daily tadalafil and gemtesa. Due to insurance issues he was only recently able to start the gemtesa. Patient reports marginal improvement in his symptoms. He notes the most improvement has been decreased nocturia. He completed a bladder diary which shows he is voiding every 1-2 hours. He has ++ urgency but due to poor mobility he does not even attempt to make it to the bathroom or wear depends he instead voids into urinals which he states he has around his house and in his car. Patient states he is most bothered by his frequency and often only voiding small volumes. He started seeing PFPT but has only been able to complete 3 sessions. Patient is understandably frustrated with his symptoms. He last had cystoscopy in 2022 that was notable for obstructing prostate and bladder trabeculations.     Current Outpatient Medications   Medication Sig Dispense Refill    Vibegron 75 MG Tab Take 1 tablet by mouth daily. 60 tablet 0    HYDROcodone-acetaminophen (NORCO) 5-325 MG per tablet Take 1 tablet by mouth every 12 hours as needed for Pain. 60 tablet 0    Lantus SoloStar 100 UNIT/ML pen-injector INJECT SUBCUTANEOUSLY 40 UNITS  AT NIGHT . PRIME 2 UNITS BEFORE  EACH DOSE. 45 mL 3    DISPENSE Gemtesa one daily      gabapentin (NEURONTIN) 300 MG capsule TAKE 1 CAPSULE BY MOUTH 4 TIMES  DAILY 360 capsule 1     naltrexone 4.5 mg capsule Take 1 capsule by mouth nightly. 90 g 1    mirabegron ER (MYRBETRIQ) 50 MG 24 hr tablet Take 1 tablet by mouth daily. (Patient not taking: Reported on 2/13/2025) 90 tablet 3    tadalafil (CIALIS) 5 MG tablet Take 1 tablet by mouth daily. 90 tablet 3    hydroCORTisone (CORTIZONE) 2.5 % cream APPLY TOPICALLY TO FACE TWICE DAILY 30 g 1    dilTIAZem (CARDIZEM CD) 120 MG 24 hr capsule Take 1 capsule by mouth daily. Note dose decreased 90 capsule 3    metFORMIN (GLUCOPHAGE) 500 MG tablet Take 2 tablets by mouth in the morning and 2 tablets in the evening. Take with meals. 360 tablet 3    sotalol (BETAPACE) 80 MG tablet Take 1 tablet by mouth in the morning and 1 tablet in the evening. 180 tablet 3    warfarin (COUMADIN) 10 MG tablet 10mg Mon Wed & Fri 5mg on Tues Thurs Sat & Sun (Patient taking differently: 10mg Tues Thurs Sat & Sun and 5mg Mon Wed and Fri) 90 tablet 3    fluoxetine (PROzac) 40 MG capsule Take 1 capsule by mouth daily. 90 capsule 3    Insulin Lispro, 1 Unit Dial, (HumaLOG KwikPen) 100 UNIT/ML pen-injector Inject 8 Units into the skin in the morning and 8 Units at noon and 8 Units in the evening. Inject before meals. Prime 2 units before each dose. 30 mL 3    hydroCHLOROthiazide 25 MG tablet Take 1 tablet by mouth daily. 90 tablet 3    losartan (COZAAR) 50 MG tablet Take 1 tablet by mouth daily. 90 tablet 3    Blood Glucose Monitoring Suppl (OneTouch Verio Flex System) w/Device Kit       Lancets (OneTouch Delica Plus [id number]) Misc 1 each  in the morning and 1 each in the evening. 100 each 1    blood glucose (Accu-Chek Emily) test strip Test blood sugar 3 times daily as directed. 100 each 2    Isopropyl Alcohol (Alcohol Wipes) 70 % Misc Apply 2 each topically every 14 days. To use with Jenaro 3 sensor application (Patient not taking: Reported on 3/4/2025) 100 each 0    GENERIC EXTERNAL MEDICATION Apply topically daily. [place] Butt'r Cream (OTC)      insulin glargine (Lantus)  100 UNIT/ML vial solution INJECT SUBCUTANEOUSLY 80  UNITS DAILY (Patient taking differently: INJECT SUBCUTANEOUSLY 40 UNITS DAILY) 80 mL 3    MULT DEN INSERT DIR CARV/CAM Wear daily. 6 each 0    MULT DEN INSERT DIR CARV/CAM Wear daily. 6 each 0    Acetaminophen 500 MG Cap Take 1,000 mg by mouth every 6 hours as needed (pain). 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 Also has diabetic neuropathy and lumbar spondylosis. These medical conditions are not compatible with jury duty and recommend medical deferral from this obligation. 1 each 0    DISPENSE Nasal cpap at 7 cm of water.  Nasal cushions, humidified 1 each 0    MULTIVITAMINS PO TABS one daily 0 0    OXYGEN nasal C-PAP 0 0     No current facility-administered medications for this visit.     Physical exam:    General:  The patient is well developed, well nourished, in no acute distress, appears stated age.   Musculoskeletal:  Normal gait, using cane for stability     Results Reviewed:    Recent Labs   Lab 12/09/24  0955   WBC 10.3   HGB 14.4   HCT 43.9     Recent Labs   Lab 12/09/24  0955 06/11/24  0733   Sodium 142 140   Potassium 4.5 4.3   Chloride 103 103   Carbon Dioxide 31 25   BUN 18 14   Creatinine 0.95 0.86   Glucose 91 99     Recent Labs   Lab 02/27/25  1000   Protime- PT 20.8*   INR 2.0       U/A:    Recent Labs   Lab 12/13/24  1026 09/30/24  1032   USPG  --  1.020   UPH  --  6.0   UKET  --  Negative   UPROT  --  100*   UGLU  --  Negative   UBILI  --  Negative   UROB  --  0.2   UWBC  --  Negative   UNITR  --  Negative   URBC  --  Trace*   Creatinine, Urine 177.0  185.0  --    ERYTA  --  1 to 2       PSA:    PSA, Total   Date Value   12/03/2019 0.87 ng/mL   12/04/2018 0.70 ng/mL   06/09/2017 0.96 ng/mL   06/10/2014 0.42 ng/mL   06/13/2013 0.44 ng/mL   05/30/2012 0.53 NG/ML   01/26/2011 0.51 NG/ML   02/10/2010 0.60 NG/ML   12/02/2008 0.45 NG/ML   06/20/2007 0.42 NG/ML    03/08/2006 0.53 NG/ML   03/08/2005 0.33 NG/ML   03/25/2004 0.54 NG/ML   12/04/2003 0.45 NG/ML   12/05/2002 0.46 ng/mL   09/18/2001 0.6 ng/mL   08/26/2000 0.5 ng/mL   05/21/1999 0.6 ng/mL     Prostate Specific Antigen (ng/mL)   Date Value   12/06/2021 1.04       Imaging Studies (personally reviewed):    Post void residual bladder ultrasound on 3/4/2025 : 0 mL    Bladder diary reviewed on 3/4/2025:  48-hour bladder diary demonstrates patient is voiding every 1-2 hours  He voids volumes 50 to 200 cc with each void  Bladder diary notes urinary urgency with small-volume urge incontinence with patient voiding into a urinal  _________________________________________________________________________    Assessment/Plan:  Augustine Bishop is a 79 year old male with overactive bladder/lower urinary tract symptoms who presents for follow up.     1) LUTS/OAB -I discussed patient's symptoms and bladder diary with him in detail.  He has only been on Gemtesa for approximately 4 weeks.  Discussed that typically we have patients try this medication for at least 6 to 8 weeks before we determine if they will have adequate symptom improvement.  He also has only completed 3 sessions of pelvic floor PT due to needing to cancel some sessions secondary to the weather conditions.    Discussed options including trialing Gemtesa for another month and continuing on daily tadalafil as well as completing more pelvic floor PT sessions.  Discussed if this does not provide further symptom improvement he could trial triple therapy with daily tadalafil, Gemtesa and [place] or proceed with further workup with cystoscopy and urodynamics to determine if he is a good candidate for InterStim.    We discussed the InterStim procedure in detail.  Discussed that this is a staged procedure with a trial period in between.  Discussed the risk factors including but not limited to bleeding, infection and no improvement of symptoms. Patient is on warfarin and would  need to come off of this for surgery. He also has multiple medical co morbidities and would need to have pre operative clearance visit with his PCP.     After long discussion, patient elected to continue with Gemtesa and daily tadalafil for another month.  He will attempt to complete more pelvic for PT sessions and follow-up with me in another 4 weeks.  He understands that if he continues to be miserable with his symptoms I will recommend further workup.    - Instructions provided as documented in the After Visit Summary.  - The patient indicated understanding of the diagnosis and agreed with the plan of care.     Sue Spears PA-C ([place])   Urology PA   My supervising physician for this patient is, Dr. Tono Martinez MD, who is aware of the plan and was available for consult as needed.    within normal limits

## 2025-05-05 NOTE — PHYSICAL THERAPY INITIAL EVALUATION ADULT - PREDICTED DURATION OF THERAPY (DAYS/WKS), PT EVAL
Chronic patient Established Note (Follow up visit)      SUBJECTIVE:  INTERVAL HISTORY 5/5/2025:  Ms. Marcus returns for bilateral hip (right > left). Current Pain level is 9/10.  She reports the pain bothers her when she sits/stands.  She also reports the pain wakes her up at night. Since her last visit, she saw orthopedic surgery and had MRIs of her hips completed.  She reports that they offered her no intervention based off her MRI. She does feel the pain radiates into her right > left groin sometimes.    INTERVAL HISTORY 9/23/2024:  Riley Marcus is a 69 y.o. who presents to the clinic for follow up evaluation and management of low back and BL hip pain in the setting of SIJ dysfunction, lumbar radiculopathy, greater trochanteric pain syndrome, and myofascial pain syndrome.. Last seen in clinic on 3/21/2024 at which time we discussed formal PT referral for persistent pain complaints. Today, pt persents with primary complaint of BL buttock pain and lateral hip pain. Symptoms localized over ischium bilaterally and greater troch bilaterally. Pain is worse with prolonged walking and sitting  . Current pain 7-8/10.  Ibuprofen, naproxen, somewhat helpful--pt advised to discontinue NSAIDs d/t CKD.. Methocarbamol without much benefit. Tylenol 500 1-2 times daily. Also using topical lidocaine and diclofenac with benefit.    INTERVAL HISTORY 3/21/2024:  Riley Marcus presents for follow-up s/p Bilateral SIJ and Bilateral GTB on 3/7/2024.  She states this has provided 70% relief to her SIJ and 0% to the GTB. She continues robaxin 500 mg TID PRN and Lidocaine patches as needed with  moderate relief.  She denies any perceived side effects.   The patient denies fever/night sweats, urinary incontinence, bowel incontinence, significant weight changes, significant motor weakness or changes, or loss of sensations.  Her pain today is 7/10    INTERVAL HISTORY 2/26/2024:  Riley Marcus presents to the clinic for a follow-up  appointment for chronic pain. Current pain intensity is 8/10.  Since the last visit, Riley Marcus states:  after her piriformis injection, she had 100% pain relief which lasted 3 weeks.  She however states that the pain has returned, and is worse in the morning.  It tends to feel better as the day progresses.  She continues to go to the gym 3 times a week and actively participates in a home exercise program to help her pain.      PRIOR HISTORY:   Riley Marcus presents to the clinic for the evaluation of lower back and bilateral buttocks pain. The pain started many years ago following no inciting event and symptoms have been worsening.The pain is located in the lower back area and radiates to the bilateral buttocks.  The pain is described as sharp and is rated as 8/10. The pain is rated with a score of  5/10 on the BEST day and a score of 8/10 on the WORST day.  Symptoms interfere with daily activity. The pain is exacerbated by walking, kneeling (putting close together), standing.  The pain is mitigated by moving legs far apart. The patient reports 7-8 hours of uninterrupted sleep per night.    Patient denies urinary incontinence, bowel incontinence, and significant motor weakness. She had a caudal MELL in 2015 which she didn't find beneficial    Physical Therapy/Home Exercise: yes, completed in October 2023. Continuing to do home exercise program.      Pain Disability Index Review:      5/5/2025     8:44 AM 9/23/2024    10:19 AM 3/21/2024     1:27 PM   Last 3 PDI Scores   Pain Disability Index (PDI) 56 49 32         Pain Medications:   - tylenol extended release   - ibuprofen   - naproxen     report:  Reviewed and consistent with medication use as prescribed.  03/11/2022 03/11/2022 1 Oxycodone-Acetaminophn 7.5-325 16.00 4 [facility]       Pain Procedures:   11/23/2015: Caudal MELL ([place])  1/18/2024: Bilateral piriformis 100% for 3 weeks (still resolution of sciatica pain)  3/7/2024 - Bilateral SIJ and  Bilateral GTB - 70% in the SIJ 0% in GTB relief  10/4/2024 - Bilateral GTB block    Imaging:   MRI HIP WITHOUT CONTRAST LEFT     CLINICAL HISTORY:  Hip pain, chronic, tendon/ligament abnormality suspected, xray done;  Pain in right hip     FINDINGS:  No fracture dislocation bone destruction seen.  There is no evidence of AVN or transient osteopenia.  There is no significant joint fluid.  No adenopathy is seen.  Intrapelvic contents are unremarkable.  There is mild DJD.  There is a possible chronic labral tear.  No definite impingement change seen.     Impression:     Mild DJD and possible chronic labral tear.        Electronically signed by:Grey Ochoa MD  Date:                                            02/03/2025  Time:                                           08:35    MRI HIP WITHOUT CONTRAST RIGHT     CLINICAL HISTORY:  Hip pain, chronic, tendon/ligament abnormality suspected, xray done;  Pain in right hip     FINDINGS:  There is mild DJD and a possible chronic labral tear.  There is mild trochanteric enthesopathy.  No fracture dislocation bone destruction seen.  No AVN seen.  Intrapelvic contents are unremarkable.  No adenopathy seen.     Impression:     Mild DJD and possible chronic labral tear.        Electronically signed by:Grey Ochoa MD  Date:                                            02/03/2025  Time:                                           08:36    MRI LUMBAR SPINE WITHOUT CONTRAST     CLINICAL HISTORY:  Low back pain, symptoms persist with > 6wks conservative treatment; Lumbago with sciatica, right side     TECHNIQUE:  Multiplanar, multisequence MR images were acquired from the thoracolumbar junction to the sacrum without the administration of contrast.     COMPARISON:  10/21/2019     FINDINGS:  Alignment: Grade 1 anterolisthesis of L3-4.     Vertebrae: 5 lumbar-type vertebral bodies. No aggressive marrow replacement process or fracture.Nobone marrow edema .     Discs: Degenerative changes  L2-L5 disc space levels with disc space narrowing L4-5.     Cord: Normal. Conus terminates at L1.     Degenerative findings:     T12-L1: There is no focal disc herniation. No significant central canal narrowing . No significant neural foraminal narrowing.     L1-L2: There is no focal disc herniation. No significant central canal narrowing . No significant neural foraminal narrowing.     L2-L3: There is no focal disc herniation.Moderate hypertrophic changes of facets/ligamentum flavum thickening. Mild central canal narrowing . No significant neural foraminal narrowing.     L3-L4: There is no focal disc herniation.Broad-based disc bulge, facet degenerative change and ligamentum flavum thickening which contribute to  severe central canal narrowing .  Moderate right and mild left neural foraminal narrowing.     L4-L5: There is no focal disc herniation.Broad-based disc bulge, facet degenerative change and ligamentum flavum thickening which contribute to  severe central canal narrowing . Moderate right and mild left neural foraminal narrowing.     L5-S1: There is no focal disc herniation.Mild hypertrophic changes of facets/ligamentum flavum thickening. Mild central canal narrowing . No significant neural foraminal narrowing.     Paraspinal muscles & soft tissues: Unremarkable.     Impression:     Lumbar spondylosis L2-L5 worst at L3-4 and L4-5.        Electronically signed by: De Ross MD  Date:                                            11/30/2023  Time:                                           09:36    Allergies:   Review of patient's allergies indicates:   Allergen Reactions    Sulfa (sulfonamide antibiotics) Itching and Rash     Other reaction(s): Rash       Current Medications:   Current Outpatient Medications   Medication Sig Dispense Refill    acetaminophen (TYLENOL) 500 MG tablet Take 500 mg by mouth every 6 (six) hours as needed for Pain.      amLODIPine (NORVASC) 10 MG tablet Take 1 tablet (10 mg total)  by mouth once daily. 90 tablet 3    apple cider vinegar 500 mg Tab       ascorbic acid, vitamin C, (VITAMIN C) 500 MG tablet Take 500 mg by mouth once daily.      aspirin 81 mg Tab Take by mouth. 1 Tablet Oral Every day      B-complex with vitamin C (Z-BEC OR EQUIV) tablet       biotin 1,000 mcg Chew       cetirizine (ZYRTEC) 10 MG tablet Take 10 mg by mouth once daily.      cholecalciferol, vitamin D3, (VITAMIN D3) 25 mcg (1,000 unit) capsule Take 1,000 Units by mouth once daily.      docusate sodium (COLACE) 100 MG capsule Take 100 mg by mouth once daily.      ezetimibe (ZETIA) 10 mg tablet Take 1 tablet (10 mg total) by mouth once daily. 90 tablet 3    green tea leaf extract 315 mg Cap Take by mouth. 1 Capsule Oral Every day      hydrALAZINE (APRESOLINE) 25 MG tablet Take 1 tablet (25 mg total) by mouth every 12 (twelve) hours. 180 tablet 3    LIDOcaine (LIDODERM) 5 % Place 1 patch onto the skin once daily. Remove & Discard patch within 12 hours or as directed by MD 30 patch 2    lisinopriL (PRINIVIL,ZESTRIL) 40 MG tablet Take 1 tablet (40 mg total) by mouth once daily. 90 tablet 3    magnesium 250 mg Tab       melatonin 1 mg Chew Take by mouth.      methocarbamoL (ROBAXIN) 500 MG Tab Take 1 tablet (500 mg total) by mouth 3 (three) times daily as needed. 90 tablet 2    metoprolol succinate (TOPROL-XL) 50 MG 24 hr tablet Take 1 tablet (50 mg total) by mouth once daily. 90 tablet 3    omega-3 fatty acids 1,000 mg Cap Take by mouth. 1 Capsule Oral Every day      pantoprazole (PROTONIX) 40 MG tablet Take 1 tablet (40 mg total) by mouth once daily. 90 tablet 3    rutin/hesp/bioflav/C/lgipyt134 (BIOFLEX ORAL) Take by mouth.      UNABLE TO FIND medication name: Tumeric 1000 mg daily      VITAMIN E,DL-ALPHA TOCOPHEROL, (VITAMIN E, BULK, MISC) by Misc.(Non-Drug; Combo Route) route.       No current facility-administered medications for this visit.       REVIEW OF SYSTEMS:    GENERAL:  No weight loss, malaise or  fevers.  HEENT:  Negative for frequent or significant headaches.  NECK:  Negative for lumps, goiter, pain and significant neck swelling.  RESPIRATORY:  Negative for cough, wheezing or shortness of breath.  CARDIOVASCULAR:  Negative for chest pain, leg swelling or palpitations.  GI:  Negative for abdominal discomfort, blood in stools or black stools or change in bowel habits.  MUSCULOSKELETAL:  See HPI.  SKIN:  Negative for lesions, rash, and itching.  PSYCH:  Negative for sleep disturbance, mood disorder and recent psychosocial stressors.  HEMATOLOGY/LYMPHOLOGY:  Negative for prolonged bleeding, bruising easily or swollen nodes. +ASA 81mg  NEURO:   No history of headaches, syncope, paralysis, seizures or tremors.  All other reviewed and negative other than HPI.    Past Medical History:  Past Medical History:   Diagnosis Date    Arthritis     Blood transfusion     Branch retinal vein occlusion of left eye     Cataract     Chronic midline low back pain with bilateral sciatica 09/08/2023    Corneal abrasion 20 yrs    ? eye due to cls     GERD (gastroesophageal reflux disease)     Hyperlipidemia     Hypertension     Lattice degeneration     Osteoarthritis     Seasonal allergies Feb 2024       Past Surgical History:  Past Surgical History:   Procedure Laterality Date    BLOCK, NERVE, PERIPHERAL Bilateral 10/4/2024    Procedure: bilateral Greater Trochanteric Bursae Block without Steroid.;  Surgeon: Raj Pierre MD;  [facility];  Service: Pain Management;  Laterality: Bilateral;  20 mins ASA ok    Breast reduction      BREAST SURGERY      COLONOSCOPY N/A 07/20/2022    Procedure: COLONOSCOPY;  Surgeon: Chanda Hawkins MD;  Location: [facility];  Service: Endoscopy;  Laterality: N/A;    ESOPHAGOGASTRODUODENOSCOPY N/A 07/20/2022    Procedure: ESOPHAGOGASTRODUODENOSCOPY (EGD);  Surgeon: Chanda Hawkins MD;  Location: [facility];  Service: Endoscopy;  Laterality: N/A;    EYE SURGERY       HYSTERECTOMY      INJECTION Bilateral 01/18/2024    Procedure: INJECTION, BILATERAL PIRIFORMIS;  Surgeon: Raj Pierre MD;  Location: [facility] PAIN [facility]GT;  Service: Pain Management;  Laterality: Bilateral;  [phone]  2 WK F/U ERIBERTO    INJECTION OF JOINT Bilateral 03/07/2024    Procedure: INJECTION, JOINT BILATERAL SI AND BILATERAL GTB;  Surgeon: Raj Pierre MD;  Location: [facility]IN [facility]GT;  Service: Pain Management;  Laterality: Bilateral;  [phone]  2 WK F/U ERIBERTO    LASER LAPAROSCOPY  1988    LASIK Bilateral     MINI-LAPAROTOMY  1988    OOPHORECTOMY      TOTAL REDUCTION MAMMOPLASTY         Family History:  Family History   Problem Relation Name Age of Onset    Arthritis Mother Misa M. mckinley     Heart disease Mother Misa M. mckinley     Hypertension Mother Misa M. mckinley     Cataracts Mother Misa M. mckinley     Heart disease Father Sagar K. Mckinley     Hypertension Father Sagar K. Mckinley     Stroke Father Sagar K. Mckinley     Diabetes Sister Kenia NAZARIO. Rivera     Hypertension Sister Kenia NAZARIO. Rivera     Hypertension Sister Wendi Mckinley     Eczema Other niece     Arthritis Maternal Grandmother Luly CRANEMaulik Barr     Heart disease Maternal Grandmother Luly JMaulik Barr     Hypertension Maternal Grandmother Ully TY Barr     Arthritis Maternal Grandfather Italo Barr     Arthritis Paternal Grandmother Hailey E. Cmkinley     Heart disease Paternal Grandmother Hailey E. Mckinley     Stroke Paternal Grandmother Hailey E. Mckinley     Arthritis Paternal Grandfather Aashish  Mckinley     No Known Problems Brother      No Known Problems Maternal Aunt      No Known Problems Maternal Uncle      No Known Problems Paternal Aunt      No Known Problems Paternal Uncle      Ovarian cancer Neg Hx      Breast cancer Neg Hx      Anxiety disorder Neg Hx      Depression Neg Hx      Suicide Neg Hx      Amblyopia Neg Hx      Blindness Neg Hx      Cancer Neg Hx      Glaucoma Neg Hx      Macular  degeneration Neg Hx      Retinal detachment Neg Hx      Strabismus Neg Hx      Thyroid disease Neg Hx      Colon cancer Neg Hx      Esophageal cancer Neg Hx         Social History:  Social History     Socioeconomic History    Marital status:     Number of children: 0   Occupational History    Occupation: RN     Employer: [employer]   Tobacco Use    Smoking status: Never     Passive exposure: Never    Smokeless tobacco: Never   Substance and Sexual Activity    Alcohol use: Not Currently     Comment: rare glass of wine  about 2 to 3 times/ year    Drug use: No    Sexual activity: Not Currently     Partners: Male     Birth control/protection: Post-menopausal, See Surgical Hx, None   Social History Narrative      several years ago    She has not been sexually-active since    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 [facility]     Social Drivers of Health     Financial Resource Strain: Low Risk  (4/3/2025)    Overall Financial Resource Strain (CARDIA)     Difficulty of Paying Living Expenses: Not hard at all   Food Insecurity: No Food Insecurity (4/3/2025)    Hunger Vital Sign     Worried About Running Out of Food in the Last Year: Never true     Ran Out of Food in the Last Year: Never true   Transportation Needs: No Transportation Needs (4/3/2025)    PRAPARE - Transportation     Lack of Transportation (Medical): No     Lack of Transportation (Non-Medical): No   Physical Activity: Sufficiently Active (4/3/2025)    Exercise Vital Sign     Days of Exercise per Week: 6 days     Minutes of Exercise per Session: 60 min   Recent Concern: Physical Activity - Insufficiently Active (2025)    Exercise Vital Sign     Days of Exercise per Week: 2 days     Minutes of Exercise per Session: 70 min   Stress: No Stress Concern Present (4/3/2025)    [employer] - Occupational Stress Questionnaire     Feeling of Stress : Not at all   Housing Stability: Low  "Risk  (4/3/2025)    Housing Stability Vital Sign     Unable to Pay for Housing in the Last Year: No     Number of Times Moved in the Last Year: 0     Homeless in the Last Year: No       OBJECTIVE:    BP (!) 174/91 (BP Location: Left arm, Patient Position: Sitting)   Pulse 75   Ht 5' 2" (1.575 m)   Wt 80.9 kg (178 lb 5.6 oz)   BMI 32.62 kg/m²     PHYSICAL EXAMINATION:  General appearance: Well appearing, in no acute distress, alert and appropriately communicative.  Psych:  Mood and affect appropriate.  Skin: Skin color, texture, turgor normal, no rashes or lesions, in both upper and lower body.  Head/face:  Atraumatic, normocephalic.  Cor: regular rate  Pulm: non-labored breathing  GI: Abdomen non-distended and non-tender.  Back: Straight leg raising in the sitting and supine positions is negative to radicular pain.  pain to palpation over the spine and/or paraspinal muscles. Pain with lumbar extension and facet loading.  +bilateral Yeomans, +bilateral Phoebe finger, +SI tenderness  Extremities: Peripheral joint ROM is full and pain free without obvious instability or laxity in all four extremities. No deformities, edema, or skin discoloration. Good capillary refill.  Musculoskeletal: No gross deformities on inspection. Significant TTP over bilateral trochanteric bursa. .  Bilateral upper and lower extremity strength is normal and symmetric.  No atrophy or tone abnormalities are noted.  Neuro: Bilateral upper and lower extremity coordination and muscle stretch reflexes are physiologic and symmetric.  Negative Clonus. No loss of sensation is noted.  Gait: antalgic.    CMP  Sodium   Date Value Ref Range Status   02/17/2025 141 136 - 145 mmol/L Final     Potassium   Date Value Ref Range Status   02/17/2025 3.8 3.5 - 5.1 mmol/L Final     Chloride   Date Value Ref Range Status   02/17/2025 102 95 - 110 mmol/L Final     CO2   Date Value Ref Range Status   02/17/2025 28 23 - 29 mmol/L Final     Glucose   Date Value Ref " Range Status   02/17/2025 99 70 - 110 mg/dL Final     BUN   Date Value Ref Range Status   02/17/2025 24 (H) 8 - 23 mg/dL Final     Creatinine   Date Value Ref Range Status   02/17/2025 1.6 (H) 0.5 - 1.4 mg/dL Final     Calcium   Date Value Ref Range Status   02/17/2025 10.3 8.7 - 10.5 mg/dL Final     Total Protein   Date Value Ref Range Status   02/17/2025 8.5 (H) 6.0 - 8.4 g/dL Final     Albumin   Date Value Ref Range Status   02/17/2025 3.8 3.5 - 5.2 g/dL Final     Total Bilirubin   Date Value Ref Range Status   02/17/2025 0.5 0.1 - 1.0 mg/dL Final     Comment:     For infants and newborns, interpretation of results should be based  on gestational age, weight and in agreement with clinical  observations.    Premature Infant recommended reference ranges:  Up to 24 hours.............<8.0 mg/dL  Up to 48 hours............<12.0 mg/dL  3-5 days..................<15.0 mg/dL  6-29 days.................<15.0 mg/dL       Alkaline Phosphatase   Date Value Ref Range Status   02/17/2025 71 40 - 150 U/L Final     AST   Date Value Ref Range Status   02/17/2025 20 10 - 40 U/L Final     ALT   Date Value Ref Range Status   02/17/2025 15 10 - 44 U/L Final     Anion Gap   Date Value Ref Range Status   02/17/2025 11 8 - 16 mmol/L Final     eGFR   Date Value Ref Range Status   02/17/2025 35 (A) >60 mL/min/1.73 m^2 Final   11/21/2023 31 (L) > OR = 60 mL/min/1.73m2 Final       ASSESSMENT: 69 y.o. year old female with buttock/hip pain, consistent with:    1. Bilateral primary osteoarthritis of hip  Procedure Order to Pain Management      2. Greater trochanteric bursitis, unspecified laterality  Procedure Order to Pain Management          IMPRESSION: Riley Marcus presents today for bilateral hip and bilateral buttock.  Symptoms are primarily related to greater trochanteric pain syndrome, bilateral hip arthropathy, and ischial bursitis bilaterally. Recent XR imaging reviewed with evidence of diffuse pelvic enthesopathy which is likely  contributing to symptoms. She continues to have GTB bursitis, but she also has more hip arthropathy. We will offer an intervention to help with this.    PLAN:   - I have stressed the importance of physical activity and a home exercise plan to help with pain and improve health.  - Patient can continue with medications for now since they are providing benefits, using them appropriately, and without side effects. The following recommendations to medication changes:   - Schedule patient for Bilateral hip steroid injection  - we discussed her GTB blocks. She may have had benefit, but she doesn't know where she put her pain diary. She will look for this and get back to us. It GTB block was successful can proceed with cooled RF of bilateral greater trochanteric bursae.   - we can consider ischial bursa injection once she has considered workup/management for her GTB pain.  - Continue physician prescribed HEP/HSP.   - Counseled patient regarding the importance of activity modification and physical therapy.  - f/u 2 weeks after intervention    The above plan and management options were discussed at length with patient. Patient is in agreement with the above and verbalized understanding.    Raj Pierre MD PhD           2 weeks